# Patient Record
Sex: MALE | Race: WHITE
[De-identification: names, ages, dates, MRNs, and addresses within clinical notes are randomized per-mention and may not be internally consistent; named-entity substitution may affect disease eponyms.]

---

## 2017-01-02 ENCOUNTER — HOSPITAL ENCOUNTER (EMERGENCY)
Dept: HOSPITAL 47 - EC | Age: 52
Discharge: HOME | End: 2017-01-02
Payer: COMMERCIAL

## 2017-01-02 VITALS
TEMPERATURE: 97.8 F | RESPIRATION RATE: 18 BRPM | DIASTOLIC BLOOD PRESSURE: 74 MMHG | HEART RATE: 69 BPM | SYSTOLIC BLOOD PRESSURE: 135 MMHG

## 2017-01-02 DIAGNOSIS — Z88.6: ICD-10-CM

## 2017-01-02 DIAGNOSIS — J44.9: ICD-10-CM

## 2017-01-02 DIAGNOSIS — Z79.51: ICD-10-CM

## 2017-01-02 DIAGNOSIS — F17.200: ICD-10-CM

## 2017-01-02 DIAGNOSIS — J06.9: Primary | ICD-10-CM

## 2017-01-02 PROCEDURE — 87502 INFLUENZA DNA AMP PROBE: CPT

## 2017-01-02 PROCEDURE — 87430 STREP A AG IA: CPT

## 2017-01-02 PROCEDURE — 87081 CULTURE SCREEN ONLY: CPT

## 2017-01-02 PROCEDURE — 99285 EMERGENCY DEPT VISIT HI MDM: CPT

## 2017-01-02 PROCEDURE — 94640 AIRWAY INHALATION TREATMENT: CPT

## 2017-01-02 PROCEDURE — 71020: CPT

## 2017-01-02 NOTE — XR
EXAMINATION TYPE: XR chest 2V

 

DATE OF EXAM: 1/2/2017 8:50 PM

 

COMPARISON: NONE

 

HISTORY: Cough and congestion

 

TECHNIQUE:  Frontal and lateral views of the chest are obtained.

 

FINDINGS:  

There is no focal air space opacity, pleural effusion, or pneumothorax seen.  

 

The cardiac silhouette size is within normal limits.   

 

There is thoracic dextro curvature. The osseous structures are intact.

 

IMPRESSION:  NO ACUTE CARDIOPULMONARY PROCESS.

## 2017-01-02 NOTE — ED
General Adult HPI





- General


Chief complaint: Shortness of Breath


Stated complaint: diff swallow, coughing


Time Seen by Provider: 01/02/17 19:41


Source: patient, RN notes reviewed


Mode of arrival: ambulatory


Limitations: no limitations





- History of Present Illness


Initial comments: 





Patient is a 51-year-old male presents to the emergency room for evaluation of 

cough and congestion.  Patient states he's had a cough for the past week.  

Patient denies taking anything for symptoms.  Patient states he has a 

productive cough and is coughing up white phlegm.  Patient does admit to having 

history of COPD which he uses an inhaler as needed for.  Patient also states 

that he smokes about a pack per day.  Patient denies any fevers.  Patient 

denies chills.  Patient also states he's had a sore throat for the past week.  

Patient states it hurts to swallow.  Patient denies abdominal pain, nausea, 

vomiting, headache, dizziness, ear pain, neck pain.  Patient states he feels 

short of breath after having a coughing attack.  Patient denies any chest pain.

  Patient denies any other significant past medical history besides COPD.





- Related Data


 Home Medications











 Medication  Instructions  Recorded  Confirmed


 


Budesonide/Formoterol Fumarate 2 puff INHALATION RT-BID 01/02/17 01/02/17





[Symbicort 160-4.5 Mcg Inhaler]   








 Previous Rx's











 Medication  Instructions  Recorded


 


Azithromycin [Zithromax Z-pack] 250 mg PO AS DIRECTED #6 tab 01/02/17


 


predniSONE 50 mg PO DAILY #5 tab 01/02/17











 Allergies











Allergy/AdvReac Type Severity Reaction Status Date / Time


 


aspirin Allergy  Rash/Hives Verified 07/28/16 01:49














Review of Systems


ROS Statement: 


Those systems with pertinent positive or pertinent negative responses have been 

documented in the HPI.





ROS Other: All systems not noted in ROS Statement are negative.





Past Medical History


Past Medical History: COPD


History of Any Multi-Drug Resistant Organisms: None Reported


Past Surgical History: No Surgical Hx Reported


Additional Past Surgical History / Comment(s): abdominal surgery


Past Psychological History: No Psychological Hx Reported


Smoking Status: Current every day smoker


Past Alcohol Use History: Occasional


Past Drug Use History: None Reported





General Exam





- General Exam Comments


Initial Comments: 





Sitting in exam room in no acute distress.


Limitations: no limitations


General appearance: alert, in no apparent distress


Head exam: Present: atraumatic, normocephalic, normal inspection


Eye exam: Present: normal appearance


ENT exam: Present: normal exam, normal oropharynx, mucous membranes moist, TM's 

normal bilaterally


Neck exam: Present: normal inspection, full ROM.  Absent: tenderness, 

lymphadenopathy


Respiratory exam: Present: wheezes.  Absent: respiratory distress


Cardiovascular Exam: Present: regular rate, normal rhythm, normal heart sounds


GI/Abdominal exam: Present: soft, normal bowel sounds.  Absent: distended, 

tenderness, guarding, rebound, rigid


Extremities exam: Present: normal inspection, normal capillary refill


Back exam: Present: normal inspection


Neurological exam: Present: alert, oriented X3, CN II-XII intact, normal gait


Psychiatric exam: Present: normal affect, normal mood


Skin exam: Present: warm, dry, intact, normal color.  Absent: rash





Course


 Vital Signs











  01/02/17 01/02/17 01/02/17





  18:25 20:31 20:46


 


Temperature 97.4 F L  


 


Pulse Rate 76 60 66


 


Respiratory 16  





Rate   


 


Blood Pressure 139/74  


 


O2 Sat by Pulse 98  





Oximetry   














  01/02/17





  21:54


 


Temperature 97.8 F


 


Pulse Rate 69


 


Respiratory 18





Rate 


 


Blood Pressure 135/74


 


O2 Sat by Pulse 96





Oximetry 














Medical Decision Making





- Medical Decision Making





Patient is a 51-year-old male presents emergency room for evaluation of cough 

and congestion.  Chest x-ray shows no acute findings.  Patient states he feels 

better after the updraft.  Given patient's past medical history of COPD and 

symptoms lasting longer than 7 days, will place patient on antibiotics and 

prednisone.  Discussed smoking cessation with patient.  Patient states he 

understands everything that was discussed with him.  Return parameters 

discussed.  Case discussed with Dr. Mendoza.





- Lab Data


 Lab Results











  01/02/17 01/02/17 Range/Units





  20:21 20:21 


 


Influenza Type A RNA  Not Detected   (Not Detectd)  


 


Influenza Type B (PCR)  Not Detected   (Not Detectd)  


 


Group A Strep Rapid   Negative  (Negative)  














- Radiology Data


Radiology results: report reviewed, image reviewed





Disposition


Clinical Impression: 


 Upper respiratory infection, COPD (chronic obstructive pulmonary disease)





Disposition: HOME SELF-CARE


Condition: Good


Instructions:  COPD (Chronic Obstructive Pulmonary Disease) (ED), How to Stop 

Smoking (ED)


Additional Instructions: 


Take medications as directed.  Continue using inhaler as needed.  Please follow 

up with primary care provider in 24-48 hours for reevaluation.  If any new 

symptom arises, symptoms worsen or fever develops, return to ER as soon as 

possible.  


Prescriptions: 


Azithromycin [Zithromax Z-pack] 250 mg PO AS DIRECTED #6 tab


predniSONE 50 mg PO DAILY #5 tab


Referrals: 


Caroline Szymanski MD [Primary Care Provider] - 1-2 days


Time of Disposition: 21:43

## 2017-07-24 ENCOUNTER — HOSPITAL ENCOUNTER (EMERGENCY)
Dept: HOSPITAL 47 - EC | Age: 52
Discharge: HOME | End: 2017-07-24
Payer: COMMERCIAL

## 2017-07-24 VITALS
RESPIRATION RATE: 18 BRPM | HEART RATE: 67 BPM | TEMPERATURE: 97.7 F | SYSTOLIC BLOOD PRESSURE: 148 MMHG | DIASTOLIC BLOOD PRESSURE: 76 MMHG

## 2017-07-24 DIAGNOSIS — F17.200: ICD-10-CM

## 2017-07-24 DIAGNOSIS — Z88.6: ICD-10-CM

## 2017-07-24 DIAGNOSIS — Z79.899: ICD-10-CM

## 2017-07-24 DIAGNOSIS — H72.92: Primary | ICD-10-CM

## 2017-07-24 DIAGNOSIS — J44.9: ICD-10-CM

## 2017-07-24 DIAGNOSIS — Z79.51: ICD-10-CM

## 2017-07-24 PROCEDURE — 99282 EMERGENCY DEPT VISIT SF MDM: CPT

## 2017-07-24 NOTE — ED
General Adult HPI





- General


Chief complaint: ENT


Stated complaint: ear pain


Time Seen by Provider: 07/24/17 22:40


Source: patient, RN notes reviewed


Mode of arrival: ambulatory


Limitations: no limitations





- History of Present Illness


Initial comments: 





53 yo male with history of COPD presents with bleeding from his left ear.  

Patient states he was cleaning his ear with a Q-tip, and he was able to get 

some black substance out and then immediately had bright red blood.  He also 

had some pain associated with the use of the Q-tip.  There was no fever chills.

  No pain prior to using a Q-tip.  Denies any hearing loss.  Denies any 

persistent bleeding.





- Related Data


 Home Medications











 Medication  Instructions  Recorded  Confirmed


 


Budesonide/Formoterol Fumarate 2 puff INHALATION RT-BID 01/02/17 07/24/17





[Symbicort 160-4.5 Mcg Inhaler]   


 


Ipratropium-Albuterol Nebulize 3 ml INHALATION RT-Q6H 07/24/17 07/24/17





[Duoneb 0.5 mg-3 mg/3 ml Soln]   


 


Montelukast [Singulair] 10 mg PO HS 07/24/17 07/24/17








 Previous Rx's











 Medication  Instructions  Recorded


 


Ciprofloxacin-Hc Otic Susp [Cipro 3 drops LEFT EAR BID #10 ml 07/24/17





Hc Otic Suspension]  











 Allergies











Allergy/AdvReac Type Severity Reaction Status Date / Time


 


aspirin Allergy  Rash/Hives Verified 07/24/17 22:52














Review of Systems


ROS Statement: 


Those systems with pertinent positive or pertinent negative responses have been 

documented in the HPI.





ROS Other: All systems not noted in ROS Statement are negative.





Past Medical History


Past Medical History: COPD


History of Any Multi-Drug Resistant Organisms: None Reported


Past Surgical History: No Surgical Hx Reported


Additional Past Surgical History / Comment(s): abdominal surgery


Past Psychological History: No Psychological Hx Reported


Smoking Status: Current every day smoker


Past Alcohol Use History: Occasional


Past Drug Use History: None Reported





General Exam


Limitations: no limitations


General appearance: alert, in no apparent distress


Head exam: Present: atraumatic, normocephalic


Eye exam: Present: normal appearance, PERRL


ENT exam: Present: normal exam, normal oropharynx, other (Left external ear 

canal has small amount dry blood, the anterior portion of the left tympanic 

membrane is ruptured.  There is no active hemorrhage.)


Neck exam: Present: normal inspection, full ROM


Respiratory exam: Present: normal lung sounds bilaterally.  Absent: respiratory 

distress


Cardiovascular Exam: Present: regular rate, normal rhythm


GI/Abdominal exam: Present: soft.  Absent: distended





Course





 Vital Signs











  07/24/17





  22:32


 


Temperature 97.7 F


 


Pulse Rate 67


 


Respiratory 18





Rate 


 


Blood Pressure 148/76


 


O2 Sat by Pulse 97





Oximetry 














Medical Decision Making





- Medical Decision Making





52-year-old male presents with bleeding from the left ear.  This was after 

using a Q-tip.  Patient did feel moderate amount of pain which is currently 

resolved.  Denies any hearing loss.  On examination he does have a ruptured 

tympanic membrane on the left.  There is no active bleeding.  Patient is 

prescribed ciprofloxacin suspension and will follow up with both his primary 

care physician and ENT.





Disposition


Clinical Impression: 


 Ruptured tympanic membrane





Disposition: HOME SELF-CARE


Condition: Good


Instructions:  Ruptured Eardrum (ED)


Prescriptions: 


Ciprofloxacin-Hc Otic Susp [Cipro Hc Otic Suspension] 3 drops LEFT EAR BID #10 

ml


Referrals: 


Caroline Szymanski MD [Primary Care Provider] - 1-2 days


Esequiel Torres DO [Doctor of Osteopathic Medicine] - 1-2 days


Time of Disposition: 23:01

## 2018-01-25 ENCOUNTER — HOSPITAL ENCOUNTER (EMERGENCY)
Dept: HOSPITAL 47 - EC | Age: 53
Discharge: HOME | End: 2018-01-25
Payer: COMMERCIAL

## 2018-01-25 VITALS
SYSTOLIC BLOOD PRESSURE: 127 MMHG | RESPIRATION RATE: 20 BRPM | TEMPERATURE: 97 F | HEART RATE: 62 BPM | DIASTOLIC BLOOD PRESSURE: 76 MMHG

## 2018-01-25 DIAGNOSIS — Z88.6: ICD-10-CM

## 2018-01-25 DIAGNOSIS — F17.210: ICD-10-CM

## 2018-01-25 DIAGNOSIS — J20.9: Primary | ICD-10-CM

## 2018-01-25 DIAGNOSIS — Z79.52: ICD-10-CM

## 2018-01-25 DIAGNOSIS — J44.0: ICD-10-CM

## 2018-01-25 LAB
ALBUMIN SERPL-MCNC: 3.9 G/DL (ref 3.5–5)
ALP SERPL-CCNC: 66 U/L (ref 38–126)
ALT SERPL-CCNC: 35 U/L (ref 21–72)
ANION GAP SERPL CALC-SCNC: 7 MMOL/L
AST SERPL-CCNC: 23 U/L (ref 17–59)
BASOPHILS # BLD AUTO: 0.1 K/UL (ref 0–0.2)
BASOPHILS NFR BLD AUTO: 1 %
BUN SERPL-SCNC: 11 MG/DL (ref 9–20)
CALCIUM SPEC-MCNC: 9.5 MG/DL (ref 8.4–10.2)
CHLORIDE SERPL-SCNC: 105 MMOL/L (ref 98–107)
CO2 SERPL-SCNC: 28 MMOL/L (ref 22–30)
EOSINOPHIL # BLD AUTO: 0.1 K/UL (ref 0–0.7)
EOSINOPHIL NFR BLD AUTO: 2 %
ERYTHROCYTE [DISTWIDTH] IN BLOOD BY AUTOMATED COUNT: 4.86 M/UL (ref 4.3–5.9)
ERYTHROCYTE [DISTWIDTH] IN BLOOD: 12.5 % (ref 11.5–15.5)
GLUCOSE SERPL-MCNC: 98 MG/DL (ref 74–99)
HCT VFR BLD AUTO: 45.6 % (ref 39–53)
HGB BLD-MCNC: 15.3 GM/DL (ref 13–17.5)
LYMPHOCYTES # SPEC AUTO: 1.6 K/UL (ref 1–4.8)
LYMPHOCYTES NFR SPEC AUTO: 22 %
MCH RBC QN AUTO: 31.4 PG (ref 25–35)
MCHC RBC AUTO-ENTMCNC: 33.4 G/DL (ref 31–37)
MCV RBC AUTO: 93.8 FL (ref 80–100)
MONOCYTES # BLD AUTO: 0.4 K/UL (ref 0–1)
MONOCYTES NFR BLD AUTO: 6 %
NEUTROPHILS # BLD AUTO: 5.1 K/UL (ref 1.3–7.7)
NEUTROPHILS NFR BLD AUTO: 69 %
PLATELET # BLD AUTO: 275 K/UL (ref 150–450)
POTASSIUM SERPL-SCNC: 4.2 MMOL/L (ref 3.5–5.1)
PROT SERPL-MCNC: 6.4 G/DL (ref 6.3–8.2)
SODIUM SERPL-SCNC: 140 MMOL/L (ref 137–145)
WBC # BLD AUTO: 7.4 K/UL (ref 3.8–10.6)

## 2018-01-25 PROCEDURE — 94640 AIRWAY INHALATION TREATMENT: CPT

## 2018-01-25 PROCEDURE — 71046 X-RAY EXAM CHEST 2 VIEWS: CPT

## 2018-01-25 PROCEDURE — 85025 COMPLETE CBC W/AUTO DIFF WBC: CPT

## 2018-01-25 PROCEDURE — 99284 EMERGENCY DEPT VISIT MOD MDM: CPT

## 2018-01-25 PROCEDURE — 87502 INFLUENZA DNA AMP PROBE: CPT

## 2018-01-25 PROCEDURE — 36415 COLL VENOUS BLD VENIPUNCTURE: CPT

## 2018-01-25 PROCEDURE — 93005 ELECTROCARDIOGRAM TRACING: CPT

## 2018-01-25 PROCEDURE — 80053 COMPREHEN METABOLIC PANEL: CPT

## 2018-01-25 PROCEDURE — 96360 HYDRATION IV INFUSION INIT: CPT

## 2018-01-25 NOTE — ED
General Adult HPI





- General


Chief complaint: Upper Respiratory Infection


Stated complaint: FLU LIKE SYMPTOMS


Time Seen by Provider: 01/25/18 08:59


Source: patient, RN notes reviewed


Mode of arrival: ambulatory


Limitations: no limitations





- History of Present Illness


Initial comments: 





Patient 52-year-old male who presents emergency room today with chief complaint 

of increased bodyaches for cough congestion that started yesterday.  He doesn't 

chills.  He received body aches in his back and his arms.  Patient states fell 

and could not get warm last night.  He does admit to congestion and cough with 

sputum production as been white in color.  States all the symptoms started 

yesterday.  Has not taken any Tylenol Motrin.  Patient denies any other 

complaints or symptoms.  Patient admits to history of COPD a pack-a-day smoker. 

Patient denies any recent shortness of breath, chest pain, nausea or vomiting, 

numbness or tingling, dysuria or hematuria, constipation, headaches or visual 

changes, or any other complaints.





- Related Data


 Home Medications











 Medication  Instructions  Recorded  Confirmed


 


Ipratropium-Albuterol Nebulize 3 ml INHALATION RT-Q6H PRN 07/24/17 01/25/18





[Duoneb 0.5 mg-3 mg/3 ml Soln]   


 


Albuterol Inhaler [Ventolin Hfa 1 - 2 puff INHALATION RT-Q4H PRN 01/25/18 01/25/ 18





Inhaler]   


 


predniSONE See Taper PO AS DIRECTED 01/25/18 01/25/18








 Previous Rx's











 Medication  Instructions  Recorded


 


Azithromycin [Zithromax Z-pack] 0 mg PO AS DIRECTED #6 tab 01/25/18











 Allergies











Allergy/AdvReac Type Severity Reaction Status Date / Time


 


aspirin Allergy  Rash/Hives Verified 01/25/18 09:11














Review of Systems


ROS Statement: 


Those systems with pertinent positive or pertinent negative responses have been 

documented in the HPI.





ROS Other: All systems not noted in ROS Statement are negative.





Past Medical History


Past Medical History: COPD


History of Any Multi-Drug Resistant Organisms: None Reported


Past Surgical History: No Surgical Hx Reported


Additional Past Surgical History / Comment(s): abdominal surgery


Past Psychological History: No Psychological Hx Reported


Smoking Status: Current every day smoker


Past Alcohol Use History: Occasional


Past Drug Use History: None Reported





General Exam





- General Exam Comments


Initial Comments: 





General:  The patient is awake and alert, in no distress, and does not appear 

acutely ill. 


Eye:  Pupils are equal, round and reactive to light, extra-ocular movements are 

intact.  No nystagmus.  There is normal conjunctiva bilaterally.  No signs of 

icterus.  


Ears, nose, mouth and throat:  There are moist mucous membranes and no oral 

lesions. 


Neck:  The neck is supple, there is no tenderness or JVD.  


Cardiovascular:  There is a regular rate and rhythm. No murmur, rub or gallop 

is appreciated.


Respiratory: Expiratory wheeze bilaterally.  respirations are non-labored, 

breath sounds are equal.  No stridor, rales, or rhonchi.


Gastrointestinal:  Soft, non-distended, non-tender abdomen without masses or 

organomegaly noted. There is no rebound or guarding present.  No CVA 

tenderness. Bowel sounds are unremarkable.


Musculoskeletal:  Normal ROM, no tenderness.  Strength 5/5. Sensation intact. 

Pulses equal bilaterally 2+.  


Neurological:  A&O x 3. CN II-XII intact, There are no obvious motor or sensory 

deficits. Coordination appears grossly intact. Speech is normal.


Skin:  Skin is warm and dry and no rashes or lesions are noted. 


Psychiatric:  Cooperative, appropriate mood & affect, normal judgment.  


Limitations: no limitations





Course


 Vital Signs











  01/25/18 01/25/18 01/25/18





  08:53 09:55 10:04


 


Temperature 97.2 F L  


 


Pulse Rate 66 51 L 55 L


 


Respiratory 18  





Rate   


 


Blood Pressure 166/83  


 


O2 Sat by Pulse 99  





Oximetry   














  01/25/18





  10:22


 


Temperature 


 


Pulse Rate 59 L


 


Respiratory 19





Rate 


 


Blood Pressure 127/64


 


O2 Sat by Pulse 99





Oximetry 














EKG Findings





- EKG Comments:


EKG Findings:: EKG performed at 0949:  A 12-lead EKG was performed and 

interpreted by me as showing the following: Rate is 54, and rhythm is normal 

sinus. There are normal QRS complexes and normal R-wave progression. ST 

segments have no elevation or depression, and TX segments appear normal.





Medical Decision Making





- Medical Decision Making





Case discussed in detail with attending physician  Patient reexamined at 

this time shows no signs of distress resting comfortably.  Patient's labs 

reviewed unremarkable.  Chest x-ray shows no pneumonia.  Patient will cover 

with antibiotics for bronchitis infection.  Currently on steroids by the family 

doctor that it began yesterday.  Advised continue steroids use antibiotic use 

of breathing treatments at home.  Advised return here to the emergency room if 

symptoms increase worsen.





- Lab Data


Result diagrams: 


 01/25/18 09:33





 01/25/18 09:33


 Lab Results











  01/25/18 01/25/18 01/25/18 Range/Units





  09:33 09:33 09:36 


 


WBC  7.4    (3.8-10.6)  k/uL


 


RBC  4.86    (4.30-5.90)  m/uL


 


Hgb  15.3    (13.0-17.5)  gm/dL


 


Hct  45.6    (39.0-53.0)  %


 


MCV  93.8    (80.0-100.0)  fL


 


MCH  31.4    (25.0-35.0)  pg


 


MCHC  33.4    (31.0-37.0)  g/dL


 


RDW  12.5    (11.5-15.5)  %


 


Plt Count  275    (150-450)  k/uL


 


Neutrophils %  69    %


 


Lymphocytes %  22    %


 


Monocytes %  6    %


 


Eosinophils %  2    %


 


Basophils %  1    %


 


Neutrophils #  5.1    (1.3-7.7)  k/uL


 


Lymphocytes #  1.6    (1.0-4.8)  k/uL


 


Monocytes #  0.4    (0-1.0)  k/uL


 


Eosinophils #  0.1    (0-0.7)  k/uL


 


Basophils #  0.1    (0-0.2)  k/uL


 


Sodium   140   (137-145)  mmol/L


 


Potassium   4.2   (3.5-5.1)  mmol/L


 


Chloride   105   ()  mmol/L


 


Carbon Dioxide   28   (22-30)  mmol/L


 


Anion Gap   7   mmol/L


 


BUN   11   (9-20)  mg/dL


 


Creatinine   0.79   (0.66-1.25)  mg/dL


 


Est GFR (MDRD) Af Amer   >60   (>60 ml/min/1.73 sqM)  


 


Est GFR (MDRD) Non-Af   >60   (>60 ml/min/1.73 sqM)  


 


Glucose   98   (74-99)  mg/dL


 


Calcium   9.5   (8.4-10.2)  mg/dL


 


Total Bilirubin   0.6   (0.2-1.3)  mg/dL


 


AST   23   (17-59)  U/L


 


ALT   35   (21-72)  U/L


 


Alkaline Phosphatase   66   ()  U/L


 


Total Protein   6.4   (6.3-8.2)  g/dL


 


Albumin   3.9   (3.5-5.0)  g/dL


 


Influenza Type A RNA    Not Detected  (Not Detectd)  


 


Influenza Type B (PCR)    Not Detected  (Not Detectd)  














Disposition


Clinical Impression: 


 Acute bronchitis





Disposition: HOME SELF-CARE


Condition: Good


Instructions:  Acute Bronchitis (ED)


Additional Instructions: 


Please use medication as discussed.  Please follow-up with family doctor in the 

next 2 days of symptoms have not improved.  Please return to emergency room if 

the symptoms increase or worsen or for any other concerns.


Prescriptions: 


Azithromycin [Zithromax Z-pack] 0 mg PO AS DIRECTED #6 tab


Referrals: 


Caroline Szymanski MD [Primary Care Provider] - 1-2 days


Time of Disposition: 11:20

## 2018-01-25 NOTE — XR
EXAMINATION TYPE: XR chest 2V

 

DATE OF EXAM: 1/25/2018

 

COMPARISON: 1/2/2017

 

INDICATION: Cough congestion

 

TECHNIQUE:  Frontal and lateral views of the chest are obtained.

 

FINDINGS:  

The heart size is normal.  

The pulmonary vasculature is normal.

The lungs are clear.  Scoliosis is present.

 

IMPRESSION:  

1. No acute pulmonary process.

## 2018-06-28 ENCOUNTER — HOSPITAL ENCOUNTER (OUTPATIENT)
Dept: HOSPITAL 47 - EC | Age: 53
Setting detail: OBSERVATION
LOS: 1 days | Discharge: HOME | End: 2018-06-29
Attending: INTERNAL MEDICINE | Admitting: INTERNAL MEDICINE
Payer: COMMERCIAL

## 2018-06-28 DIAGNOSIS — F17.210: ICD-10-CM

## 2018-06-28 DIAGNOSIS — R07.89: ICD-10-CM

## 2018-06-28 DIAGNOSIS — R20.0: ICD-10-CM

## 2018-06-28 DIAGNOSIS — M19.90: ICD-10-CM

## 2018-06-28 DIAGNOSIS — E21.3: ICD-10-CM

## 2018-06-28 DIAGNOSIS — J44.1: Primary | ICD-10-CM

## 2018-06-28 DIAGNOSIS — R42: ICD-10-CM

## 2018-06-28 DIAGNOSIS — J44.0: ICD-10-CM

## 2018-06-28 DIAGNOSIS — Z88.6: ICD-10-CM

## 2018-06-28 DIAGNOSIS — Z79.899: ICD-10-CM

## 2018-06-28 DIAGNOSIS — Z79.51: ICD-10-CM

## 2018-06-28 DIAGNOSIS — Z79.52: ICD-10-CM

## 2018-06-28 DIAGNOSIS — J20.9: ICD-10-CM

## 2018-06-28 LAB
ALBUMIN SERPL-MCNC: 4.1 G/DL (ref 3.5–5)
ALP SERPL-CCNC: 85 U/L (ref 38–126)
ALT SERPL-CCNC: 34 U/L (ref 21–72)
AMMONIA PLAS-SCNC: <9 UMOL/L (ref ?–30)
ANION GAP SERPL CALC-SCNC: 13 MMOL/L
APAP SPEC-MCNC: <10 UG/ML
APTT BLD: 22.9 SEC (ref 22–30)
AST SERPL-CCNC: 20 U/L (ref 17–59)
BASOPHILS # BLD AUTO: 0 K/UL (ref 0–0.2)
BASOPHILS NFR BLD AUTO: 0 %
BUN SERPL-SCNC: 11 MG/DL (ref 9–20)
CALCIUM SPEC-MCNC: 9.2 MG/DL (ref 8.4–10.2)
CHLORIDE SERPL-SCNC: 103 MMOL/L (ref 98–107)
CK SERPL-CCNC: 93 U/L (ref 55–170)
CO2 SERPL-SCNC: 24 MMOL/L (ref 22–30)
EOSINOPHIL # BLD AUTO: 0 K/UL (ref 0–0.7)
EOSINOPHIL NFR BLD AUTO: 0 %
ERYTHROCYTE [DISTWIDTH] IN BLOOD BY AUTOMATED COUNT: 4.89 M/UL (ref 4.3–5.9)
ERYTHROCYTE [DISTWIDTH] IN BLOOD: 13 % (ref 11.5–15.5)
GLUCOSE BLD-MCNC: 139 MG/DL (ref 75–99)
GLUCOSE SERPL-MCNC: 109 MG/DL (ref 74–99)
HCT VFR BLD AUTO: 46.5 % (ref 39–53)
HGB BLD-MCNC: 15.8 GM/DL (ref 13–17.5)
INR PPP: 1 (ref ?–1.2)
LACTATE BLDV-SCNC: 1.4 MMOL/L (ref 0.7–2)
LIPASE SERPL-CCNC: 31 U/L (ref 23–300)
LYMPHOCYTES # SPEC AUTO: 1.7 K/UL (ref 1–4.8)
LYMPHOCYTES NFR SPEC AUTO: 13 %
MAGNESIUM SPEC-SCNC: 1.8 MG/DL (ref 1.6–2.3)
MCH RBC QN AUTO: 32.4 PG (ref 25–35)
MCHC RBC AUTO-ENTMCNC: 34.1 G/DL (ref 31–37)
MCV RBC AUTO: 95.1 FL (ref 80–100)
MONOCYTES # BLD AUTO: 0.6 K/UL (ref 0–1)
MONOCYTES NFR BLD AUTO: 5 %
NEUTROPHILS # BLD AUTO: 10.7 K/UL (ref 1.3–7.7)
NEUTROPHILS NFR BLD AUTO: 81 %
PH UR: 6.5 [PH] (ref 5–8)
PLATELET # BLD AUTO: 295 K/UL (ref 150–450)
POTASSIUM SERPL-SCNC: 3.6 MMOL/L (ref 3.5–5.1)
PROT SERPL-MCNC: 6.5 G/DL (ref 6.3–8.2)
PT BLD: 9.9 SEC (ref 9–12)
SALICYLATES SERPL-MCNC: <1 MG/DL
SODIUM SERPL-SCNC: 140 MMOL/L (ref 137–145)
SP GR UR: 1 (ref 1–1.03)
TROPONIN I SERPL-MCNC: <0.012 NG/ML (ref 0–0.03)
UROBILINOGEN UR QL STRIP: <2 MG/DL (ref ?–2)
WBC # BLD AUTO: 13.1 K/UL (ref 3.8–10.6)

## 2018-06-28 PROCEDURE — 83735 ASSAY OF MAGNESIUM: CPT

## 2018-06-28 PROCEDURE — 96376 TX/PRO/DX INJ SAME DRUG ADON: CPT

## 2018-06-28 PROCEDURE — 80053 COMPREHEN METABOLIC PANEL: CPT

## 2018-06-28 PROCEDURE — 82553 CREATINE MB FRACTION: CPT

## 2018-06-28 PROCEDURE — 83880 ASSAY OF NATRIURETIC PEPTIDE: CPT

## 2018-06-28 PROCEDURE — 93005 ELECTROCARDIOGRAM TRACING: CPT

## 2018-06-28 PROCEDURE — 85610 PROTHROMBIN TIME: CPT

## 2018-06-28 PROCEDURE — 83690 ASSAY OF LIPASE: CPT

## 2018-06-28 PROCEDURE — 87040 BLOOD CULTURE FOR BACTERIA: CPT

## 2018-06-28 PROCEDURE — 85025 COMPLETE CBC W/AUTO DIFF WBC: CPT

## 2018-06-28 PROCEDURE — 94760 N-INVAS EAR/PLS OXIMETRY 1: CPT

## 2018-06-28 PROCEDURE — 85730 THROMBOPLASTIN TIME PARTIAL: CPT

## 2018-06-28 PROCEDURE — 83605 ASSAY OF LACTIC ACID: CPT

## 2018-06-28 PROCEDURE — 84443 ASSAY THYROID STIM HORMONE: CPT

## 2018-06-28 PROCEDURE — 71046 X-RAY EXAM CHEST 2 VIEWS: CPT

## 2018-06-28 PROCEDURE — 80306 DRUG TEST PRSMV INSTRMNT: CPT

## 2018-06-28 PROCEDURE — 94640 AIRWAY INHALATION TREATMENT: CPT

## 2018-06-28 PROCEDURE — 84484 ASSAY OF TROPONIN QUANT: CPT

## 2018-06-28 PROCEDURE — 82550 ASSAY OF CK (CPK): CPT

## 2018-06-28 PROCEDURE — 82140 ASSAY OF AMMONIA: CPT

## 2018-06-28 PROCEDURE — 81003 URINALYSIS AUTO W/O SCOPE: CPT

## 2018-06-28 PROCEDURE — 93306 TTE W/DOPPLER COMPLETE: CPT

## 2018-06-28 PROCEDURE — 99285 EMERGENCY DEPT VISIT HI MDM: CPT

## 2018-06-28 PROCEDURE — 96374 THER/PROPH/DIAG INJ IV PUSH: CPT

## 2018-06-28 PROCEDURE — 83520 IMMUNOASSAY QUANT NOS NONAB: CPT

## 2018-06-28 PROCEDURE — 80320 DRUG SCREEN QUANTALCOHOLS: CPT

## 2018-06-28 PROCEDURE — 36415 COLL VENOUS BLD VENIPUNCTURE: CPT

## 2018-06-28 PROCEDURE — 96361 HYDRATE IV INFUSION ADD-ON: CPT

## 2018-06-28 RX ADMIN — METHYLPREDNISOLONE SODIUM SUCCINATE SCH MG: 125 INJECTION, POWDER, FOR SOLUTION INTRAMUSCULAR; INTRAVENOUS at 23:32

## 2018-06-28 RX ADMIN — IPRATROPIUM BROMIDE AND ALBUTEROL SULFATE SCH ML: .5; 3 SOLUTION RESPIRATORY (INHALATION) at 21:40

## 2018-06-28 RX ADMIN — HEPARIN SODIUM SCH: 5000 INJECTION, SOLUTION INTRAVENOUS; SUBCUTANEOUS at 21:39

## 2018-06-28 RX ADMIN — IPRATROPIUM BROMIDE AND ALBUTEROL SULFATE SCH: .5; 3 SOLUTION RESPIRATORY (INHALATION) at 18:39

## 2018-06-28 RX ADMIN — DOXYCYCLINE SCH MG: 100 CAPSULE ORAL at 21:37

## 2018-06-28 RX ADMIN — INSULIN ASPART SCH: 100 INJECTION, SOLUTION INTRAVENOUS; SUBCUTANEOUS at 21:37

## 2018-06-28 RX ADMIN — INSULIN ASPART SCH: 100 INJECTION, SOLUTION INTRAVENOUS; SUBCUTANEOUS at 18:06

## 2018-06-28 RX ADMIN — METHYLPREDNISOLONE SODIUM SUCCINATE SCH MG: 125 INJECTION, POWDER, FOR SOLUTION INTRAMUSCULAR; INTRAVENOUS at 18:05

## 2018-06-28 RX ADMIN — BUDESONIDE SCH MG: 0.5 INHALANT ORAL at 21:39

## 2018-06-28 NOTE — XR
EXAMINATION TYPE: XR chest 2V

 

DATE OF EXAM: 6/28/2018

 

COMPARISON: 1/25/2018

 

HISTORY: 53-year-old male with chest pain

 

TECHNIQUE:  PA and lateral views

 

FINDINGS:  

Heart normal size. Aorta within normal limits. Strandy densities in the lungs with mild interstitial 
prominence and hyperinflation compatible with underlying COPD. No consolidation or pleural effusion. 
Dextroconvex scoliosis.

 

 

IMPRESSION:  

COPD and chronic parenchymal changes. No acute process seen. Scoliosis.

## 2018-06-28 NOTE — P.CNPUL
History of Present Illness


Consult date: 06/28/18


Requesting physician: Jarvis Marques


Reason for consult: COPD


Chief complaint: Shortness of breath


History of present illness: 





Jose Choi is a 53 year old male being seen and examined evaluated today while 

in the emergency room waiting for a bed inpatient.  The patient states he has 

been having some chest pressure along with significant shortness of breath that 

has been ongoing since last Saturday.  He states he was seen in the emergency 

room twice and discharged home with antibiotics and a steroid taper.  He states 

he has been having intermittent chest pain with numbness in his left arm as 

well.  He has been taking breathing treatments at home which have not been 

helping.  He does have a cough however has been unable to bring up any sputum 

due to thickness.  He denies any fevers states he has had some chills with 

lightheadedness.  Chest x-ray was reviewed and shows no acute process and 

chronic COPD.  Patient is also followed in the outpatient setting with our 

office as well.  He is a current 1 pack per day smoker for approximately 40 

years.  He did used to smoke 2 and half packs per day.  Upon examination he is 

lying in bed with family at bedside on room air.  Has shortness of breath with 

exertion and activity as well as a congested cough.  He is afebrile denies any 

further complaints.  All labs were reviewed, troponins were negative





Past Medical History


Past Medical History: COPD


History of Any Multi-Drug Resistant Organisms: None Reported


Past Surgical History: No Surgical Hx Reported


Additional Past Surgical History / Comment(s): abdominal surgery


Past Psychological History: No Psychological Hx Reported


Smoking Status: Current every day smoker


Past Alcohol Use History: Occasional


Past Drug Use History: None Reported





Medications and Allergies


 Home Medications











 Medication  Instructions  Recorded  Confirmed  Type


 


Azithromycin [Zithromax Z-pack] See Taper PO DAILY 06/28/18 06/28/18 History


 


Beclomethasone Dipropionate [Qvar 1 puff INHALATION RT-BID 06/28/18 06/28/18 

History





Redihaler]    


 


Lidocaine 4% Cream [Lmx 4] 1 applic TOPICAL BID PRN 06/28/18 06/28/18 History


 


predniSONE 60 mg PO DAILY 06/28/18 06/28/18 History











 Allergies











Allergy/AdvReac Type Severity Reaction Status Date / Time


 


aspirin Allergy  Rash/Hives Verified 06/28/18 11:04














Physical Exam


Vitals: 


 Vital Signs











  Temp Pulse Resp BP Pulse Ox


 


 06/28/18 11:01  97.6 F  95  24  177/101  96








 Intake and Output











 06/27/18 06/28/18 06/28/18





 22:59 06:59 14:59


 


Other:   


 


  Weight   65.771 kg














GENERAL EXAM: Alert, comfortable in no apparent distress.


HEAD: Normocephalic.


EYES: Normal reaction of pupils, equal size.


NOSE: Clear with pink turbinates.


THROAT: No erythema or exudates.


NECK: No masses, no JVD.


CHEST: No chest wall deformity.


LUNGS: Lungs noted to be coarse throughout with some expiratory wheezing.  

Bases diminished.


CVS: S1 and S2 normal with no audible mumurs, regular rhythm.


ABDOMEN: No hepatosplenomegaly, normal bowel sounds, no guarding or rigidity.


EXTREMITIES: No edema noted, pedal pulses palpable.


CENTRAL NERVOUS SYSTEM: No focal deficits, tone is normal in all 4 extremities.





Results





- Laboratory Findings


CBC and BMP: 


 06/28/18 11:16





 06/28/18 11:16


PT/INR, D-dimer











PT  9.9 sec (9.0-12.0)   06/28/18  11:16    


 


INR  1.0  (<1.2)   06/28/18  11:16    








Abnormal lab findings: 


 Abnormal Labs











  06/28/18 06/28/18





  11:16 11:16


 


WBC   13.1 H


 


Neutrophils #   10.7 H


 


Glucose  109 H 














- Diagnostic Findings


Chest x-ray: report reviewed, image reviewed





Assessment and Plan


Assessment: 





Assessment





Acute exacerbation of COPD


Tracheobronchitis


Nicotine dependence


Chest pain








Plan





Medications have been reviewed and will be continued as ordered. 


Add doxycycline


IV steroids


Continue with pulmonary hygiene, coughing and deep breathing exercises, and 

supportive care. 


Supplemental oxygen to maintain oxygen saturations of 92% or better. 


Continue nebulizer treatments.  Add budesonide


Obtain sputum culture


Repeat labs in the morning


Smoking cessation


GI and DVT prophylaxis. 


We will continue to monitor labs/results and adjust treatment as necessary. 


Further recommendations pending.





I performed an examination of the patient and discussed their management with 

the nurse practitioner. I have reviewed the nurse practitioner's note and agree 

with the documented findings and plan of care.

## 2018-06-28 NOTE — ED
General Adult HPI





- General


Chief complaint: Chest Pain


Stated complaint: Chest pain


Time Seen by Provider: 06/28/18 11:10


Source: patient, family, RN notes reviewed, old records reviewed


Mode of arrival: ambulatory


Limitations: no limitations





- History of Present Illness


Initial comments: 





This is a 53-year-old male to the ER for evaluation.  This male presents ER for 

evaluation regarding not acting appropriately, sleeping, fatigue, occasional 

chest pain shortness of breath right-sided dizziness.  Patient himself is a 

poor historian, not appropriately, not participating in history and physical





- Related Data


 Home Medications











 Medication  Instructions  Recorded  Confirmed


 


Azithromycin [Zithromax Z-pack] See Taper PO DAILY 06/28/18 06/28/18


 


Beclomethasone Dipropionate [Qvar 1 puff INHALATION RT-BID 06/28/18 06/28/18





Redihaler]   


 


Lidocaine 4% Cream [Lmx 4] 1 applic TOPICAL BID PRN 06/28/18 06/28/18


 


predniSONE 60 mg PO DAILY 06/28/18 06/28/18











 Allergies











Allergy/AdvReac Type Severity Reaction Status Date / Time


 


aspirin Allergy  Rash/Hives Verified 06/28/18 11:04














Review of Systems


ROS Statement: 


Those systems with pertinent positive or pertinent negative responses have been 

documented in the HPI.





ROS Other: All systems not noted in ROS Statement are negative.





Past Medical History


Past Medical History: COPD


History of Any Multi-Drug Resistant Organisms: None Reported


Past Surgical History: No Surgical Hx Reported


Additional Past Surgical History / Comment(s): abdominal surgery


Past Psychological History: No Psychological Hx Reported


Smoking Status: Current every day smoker


Past Alcohol Use History: Occasional


Past Drug Use History: None Reported





General Exam


Limitations: no limitations


General appearance: alert, in no apparent distress


Head exam: Present: atraumatic, normocephalic, normal inspection


Eye exam: Present: normal appearance, PERRL, EOMI.  Absent: scleral icterus, 

conjunctival injection, periorbital swelling


ENT exam: Present: normal exam, mucous membranes moist


Neck exam: Present: normal inspection.  Absent: tenderness, meningismus, 

lymphadenopathy


Respiratory exam: Present: normal lung sounds bilaterally.  Absent: respiratory 

distress, wheezes, rales, rhonchi, stridor


Cardiovascular Exam: Present: regular rate, normal rhythm, normal heart sounds.

  Absent: systolic murmur, diastolic murmur, rubs, gallop, clicks


GI/Abdominal exam: Present: soft, normal bowel sounds.  Absent: distended, 

tenderness, guarding, rebound, rigid


Extremities exam: Present: normal inspection, full ROM, normal capillary 

refill.  Absent: tenderness, pedal edema, joint swelling, calf tenderness


Back exam: Present: normal inspection


Neurological exam: Present: alert, oriented X3, CN II-XII intact


Psychiatric exam: Present: normal affect, normal mood


Skin exam: Present: warm, dry, intact, normal color.  Absent: rash





Course


 Vital Signs











  06/28/18





  11:01


 


Temperature 97.6 F


 


Pulse Rate 95


 


Respiratory 24





Rate 


 


Blood Pressure 177/101


 


O2 Sat by Pulse 96





Oximetry 














- Reevaluation(s)


Reevaluation #1: 





06/28/18 14:01


Patient appears to be mildly awake and arousable currently.  Breathing appears 

improved





EKG Findings





- EKG Comments:


EKG Findings:: EKG shows sinus rhythm rate of 99, , QRS 92, QTc 472





Medical Decision Making





- Medical Decision Making





53 male the ER for evaluation.  Patient presents today for evaluation COPD.  

Patient will be admitted for pulmonology evaluation, evaluation regarding 

mental state





- Lab Data


Result diagrams: 


 06/28/18 11:16





 06/28/18 11:16


 Lab Results











  06/28/18 06/28/18 06/28/18 Range/Units





  11:16 11:16 11:16 


 


WBC     (3.8-10.6)  k/uL


 


RBC     (4.30-5.90)  m/uL


 


Hgb     (13.0-17.5)  gm/dL


 


Hct     (39.0-53.0)  %


 


MCV     (80.0-100.0)  fL


 


MCH     (25.0-35.0)  pg


 


MCHC     (31.0-37.0)  g/dL


 


RDW     (11.5-15.5)  %


 


Plt Count     (150-450)  k/uL


 


Neutrophils %     %


 


Lymphocytes %     %


 


Monocytes %     %


 


Eosinophils %     %


 


Basophils %     %


 


Neutrophils #     (1.3-7.7)  k/uL


 


Lymphocytes #     (1.0-4.8)  k/uL


 


Monocytes #     (0-1.0)  k/uL


 


Eosinophils #     (0-0.7)  k/uL


 


Basophils #     (0-0.2)  k/uL


 


PT     (9.0-12.0)  sec


 


INR     (<1.2)  


 


APTT     (22.0-30.0)  sec


 


Sodium   140   (137-145)  mmol/L


 


Potassium   3.6   (3.5-5.1)  mmol/L


 


Chloride   103   ()  mmol/L


 


Carbon Dioxide   24   (22-30)  mmol/L


 


Anion Gap   13   mmol/L


 


BUN   11   (9-20)  mg/dL


 


Creatinine   0.86   (0.66-1.25)  mg/dL


 


Est GFR (CKD-EPI)AfAm   >90   (>60 ml/min/1.73 sqM)  


 


Est GFR (CKD-EPI)NonAf   >90   (>60 ml/min/1.73 sqM)  


 


Glucose   109 H   (74-99)  mg/dL


 


Plasma Lactic Acid Fritz  1.4    (0.7-2.0)  mmol/L


 


Calcium   9.2   (8.4-10.2)  mg/dL


 


Magnesium   1.8   (1.6-2.3)  mg/dL


 


Total Bilirubin   0.6   (0.2-1.3)  mg/dL


 


AST   20   (17-59)  U/L


 


ALT   34   (21-72)  U/L


 


Alkaline Phosphatase   85   ()  U/L


 


Ammonia  <9    (<30)  umol/L


 


Total Creatine Kinase    93  ()  U/L


 


CK-MB (CK-2)    2.1  (0.0-2.4)  ng/mL


 


CK-MB (CK-2) Rel Index    2.3  


 


Troponin I    <0.012  (0.000-0.034)  ng/mL


 


NT-Pro-B Natriuret Pep     pg/mL


 


Total Protein   6.5   (6.3-8.2)  g/dL


 


Albumin   4.1   (3.5-5.0)  g/dL


 


Lipase   31   ()  U/L


 


TSH   0.780   (0.465-4.680)  mIU/L


 


Urine Color     


 


Urine Appearance     (Clear)  


 


Urine pH     (5.0-8.0)  


 


Ur Specific Gravity     (1.001-1.035)  


 


Urine Protein     (Negative)  


 


Urine Glucose (UA)     (Negative)  


 


Urine Ketones     (Negative)  


 


Urine Blood     (Negative)  


 


Urine Nitrite     (Negative)  


 


Urine Bilirubin     (Negative)  


 


Urine Urobilinogen     (<2.0)  mg/dL


 


Ur Leukocyte Esterase     (Negative)  


 


Salicylates   <1.0   mg/dL


 


Urine Opiates Screen     (NotDetected)  


 


Ur Oxycodone Screen     (NotDetected)  


 


Urine Methadone Screen     (NotDetected)  


 


Ur Propoxyphene Screen     (NotDetected)  


 


Acetaminophen   <10.0   ug/mL


 


Ur Barbiturates Screen     (NotDetected)  


 


U Tricyclic Antidepress     (NotDetected)  


 


Ur Phencyclidine Scrn     (NotDetected)  


 


Ur Amphetamines Screen     (NotDetected)  


 


U Methamphetamines Scrn     (NotDetected)  


 


U Benzodiazepines Scrn     (NotDetected)  


 


Urine Cocaine Screen     (NotDetected)  


 


U Marijuana (THC) Screen     (NotDetected)  


 


Serum Alcohol   <10   mg/dL














  06/28/18 06/28/18 06/28/18 Range/Units





  11:16 11:16 11:16 


 


WBC  13.1 H    (3.8-10.6)  k/uL


 


RBC  4.89    (4.30-5.90)  m/uL


 


Hgb  15.8    (13.0-17.5)  gm/dL


 


Hct  46.5    (39.0-53.0)  %


 


MCV  95.1    (80.0-100.0)  fL


 


MCH  32.4    (25.0-35.0)  pg


 


MCHC  34.1    (31.0-37.0)  g/dL


 


RDW  13.0    (11.5-15.5)  %


 


Plt Count  295    (150-450)  k/uL


 


Neutrophils %  81    %


 


Lymphocytes %  13    %


 


Monocytes %  5    %


 


Eosinophils %  0    %


 


Basophils %  0    %


 


Neutrophils #  10.7 H    (1.3-7.7)  k/uL


 


Lymphocytes #  1.7    (1.0-4.8)  k/uL


 


Monocytes #  0.6    (0-1.0)  k/uL


 


Eosinophils #  0.0    (0-0.7)  k/uL


 


Basophils #  0.0    (0-0.2)  k/uL


 


PT    9.9  (9.0-12.0)  sec


 


INR    1.0  (<1.2)  


 


APTT    22.9  (22.0-30.0)  sec


 


Sodium     (137-145)  mmol/L


 


Potassium     (3.5-5.1)  mmol/L


 


Chloride     ()  mmol/L


 


Carbon Dioxide     (22-30)  mmol/L


 


Anion Gap     mmol/L


 


BUN     (9-20)  mg/dL


 


Creatinine     (0.66-1.25)  mg/dL


 


Est GFR (CKD-EPI)AfAm     (>60 ml/min/1.73 sqM)  


 


Est GFR (CKD-EPI)NonAf     (>60 ml/min/1.73 sqM)  


 


Glucose     (74-99)  mg/dL


 


Plasma Lactic Acid Fritz     (0.7-2.0)  mmol/L


 


Calcium     (8.4-10.2)  mg/dL


 


Magnesium     (1.6-2.3)  mg/dL


 


Total Bilirubin     (0.2-1.3)  mg/dL


 


AST     (17-59)  U/L


 


ALT     (21-72)  U/L


 


Alkaline Phosphatase     ()  U/L


 


Ammonia     (<30)  umol/L


 


Total Creatine Kinase     ()  U/L


 


CK-MB (CK-2)     (0.0-2.4)  ng/mL


 


CK-MB (CK-2) Rel Index     


 


Troponin I     (0.000-0.034)  ng/mL


 


NT-Pro-B Natriuret Pep   154   pg/mL


 


Total Protein     (6.3-8.2)  g/dL


 


Albumin     (3.5-5.0)  g/dL


 


Lipase     ()  U/L


 


TSH     (0.465-4.680)  mIU/L


 


Urine Color     


 


Urine Appearance     (Clear)  


 


Urine pH     (5.0-8.0)  


 


Ur Specific Gravity     (1.001-1.035)  


 


Urine Protein     (Negative)  


 


Urine Glucose (UA)     (Negative)  


 


Urine Ketones     (Negative)  


 


Urine Blood     (Negative)  


 


Urine Nitrite     (Negative)  


 


Urine Bilirubin     (Negative)  


 


Urine Urobilinogen     (<2.0)  mg/dL


 


Ur Leukocyte Esterase     (Negative)  


 


Salicylates     mg/dL


 


Urine Opiates Screen     (NotDetected)  


 


Ur Oxycodone Screen     (NotDetected)  


 


Urine Methadone Screen     (NotDetected)  


 


Ur Propoxyphene Screen     (NotDetected)  


 


Acetaminophen     ug/mL


 


Ur Barbiturates Screen     (NotDetected)  


 


U Tricyclic Antidepress     (NotDetected)  


 


Ur Phencyclidine Scrn     (NotDetected)  


 


Ur Amphetamines Screen     (NotDetected)  


 


U Methamphetamines Scrn     (NotDetected)  


 


U Benzodiazepines Scrn     (NotDetected)  


 


Urine Cocaine Screen     (NotDetected)  


 


U Marijuana (THC) Screen     (NotDetected)  


 


Serum Alcohol     mg/dL














  06/28/18 Range/Units





  12:55 


 


WBC   (3.8-10.6)  k/uL


 


RBC   (4.30-5.90)  m/uL


 


Hgb   (13.0-17.5)  gm/dL


 


Hct   (39.0-53.0)  %


 


MCV   (80.0-100.0)  fL


 


MCH   (25.0-35.0)  pg


 


MCHC   (31.0-37.0)  g/dL


 


RDW   (11.5-15.5)  %


 


Plt Count   (150-450)  k/uL


 


Neutrophils %   %


 


Lymphocytes %   %


 


Monocytes %   %


 


Eosinophils %   %


 


Basophils %   %


 


Neutrophils #   (1.3-7.7)  k/uL


 


Lymphocytes #   (1.0-4.8)  k/uL


 


Monocytes #   (0-1.0)  k/uL


 


Eosinophils #   (0-0.7)  k/uL


 


Basophils #   (0-0.2)  k/uL


 


PT   (9.0-12.0)  sec


 


INR   (<1.2)  


 


APTT   (22.0-30.0)  sec


 


Sodium   (137-145)  mmol/L


 


Potassium   (3.5-5.1)  mmol/L


 


Chloride   ()  mmol/L


 


Carbon Dioxide   (22-30)  mmol/L


 


Anion Gap   mmol/L


 


BUN   (9-20)  mg/dL


 


Creatinine   (0.66-1.25)  mg/dL


 


Est GFR (CKD-EPI)AfAm   (>60 ml/min/1.73 sqM)  


 


Est GFR (CKD-EPI)NonAf   (>60 ml/min/1.73 sqM)  


 


Glucose   (74-99)  mg/dL


 


Plasma Lactic Acid Fritz   (0.7-2.0)  mmol/L


 


Calcium   (8.4-10.2)  mg/dL


 


Magnesium   (1.6-2.3)  mg/dL


 


Total Bilirubin   (0.2-1.3)  mg/dL


 


AST   (17-59)  U/L


 


ALT   (21-72)  U/L


 


Alkaline Phosphatase   ()  U/L


 


Ammonia   (<30)  umol/L


 


Total Creatine Kinase   ()  U/L


 


CK-MB (CK-2)   (0.0-2.4)  ng/mL


 


CK-MB (CK-2) Rel Index   


 


Troponin I   (0.000-0.034)  ng/mL


 


NT-Pro-B Natriuret Pep   pg/mL


 


Total Protein   (6.3-8.2)  g/dL


 


Albumin   (3.5-5.0)  g/dL


 


Lipase   ()  U/L


 


TSH   (0.465-4.680)  mIU/L


 


Urine Color  Light Yellow  


 


Urine Appearance  Clear  (Clear)  


 


Urine pH  6.5  (5.0-8.0)  


 


Ur Specific Gravity  1.005  (1.001-1.035)  


 


Urine Protein  Negative  (Negative)  


 


Urine Glucose (UA)  Negative  (Negative)  


 


Urine Ketones  Negative  (Negative)  


 


Urine Blood  Negative  (Negative)  


 


Urine Nitrite  Negative  (Negative)  


 


Urine Bilirubin  Negative  (Negative)  


 


Urine Urobilinogen  <2.0  (<2.0)  mg/dL


 


Ur Leukocyte Esterase  Negative  (Negative)  


 


Salicylates   mg/dL


 


Urine Opiates Screen  Not Detected  (NotDetected)  


 


Ur Oxycodone Screen  Not Detected  (NotDetected)  


 


Urine Methadone Screen  Not Detected  (NotDetected)  


 


Ur Propoxyphene Screen  Not Detected  (NotDetected)  


 


Acetaminophen   ug/mL


 


Ur Barbiturates Screen  Not Detected  (NotDetected)  


 


U Tricyclic Antidepress  Not Detected  (NotDetected)  


 


Ur Phencyclidine Scrn  Not Detected  (NotDetected)  


 


Ur Amphetamines Screen  Not Detected  (NotDetected)  


 


U Methamphetamines Scrn  Not Detected  (NotDetected)  


 


U Benzodiazepines Scrn  Not Detected  (NotDetected)  


 


Urine Cocaine Screen  Not Detected  (NotDetected)  


 


U Marijuana (THC) Screen  Not Detected  (NotDetected)  


 


Serum Alcohol   mg/dL














- Radiology Data


Radiology results: report reviewed (Chest x-rays negative for acute disease), 

image reviewed





Disposition


Clinical Impression: 


 Acute bronchitis with COPD





Disposition: ADMITTED AS IP TO THIS HOSP


Condition: Fair


Is patient prescribed a controlled substance at d/c from ED?: No


Referrals: 


Caroline Szymanski MD [Primary Care Provider] - 1-2 days

## 2018-06-28 NOTE — P.HPIM
History of Present Illness


H&P Date: 18


Chief Complaint: Left-sided chest pain with increased shortness breath.





This is a 53-year-old  male one of Dr. Szymanski with a previous 

medical history significant for chronic tobacco use and dependence with the 

chronic COPD, patient developed to have left-sided chest pain last Friday and 

he ended up coming to the emergency department at [facility] 

yesterday and the day before yesterday and he was complaining of some headache 

at the same time he ended up having computed tomography scan of the brain that 

showed no evidence of acute of normalities, chest x-ray showed the pleural 

reaction without any evidence of acute pulmonary disease with the normal heart, 

patient was given oral antibiotic and the however the patient never took the 

medication he did receive a shot of morphine in the ER and he was sent home on 

patient today was driving to go down to [place] for evaluation according to the 

patient and that he called Dr. TIERRA Moss's office and he asked him to go to the 

ER at the [facility] where he was seen and the patient labs were okay 

patient was seen in the emergency department and he was seen earlier bipolar 

medicine he was recommended for the patient to be admitted to the hospital for 

evaluation by pulmonary as well as by cardiology due to his chest pain patient 

EKG did not show any evidence of acute abnormalities and no ST-T wave changes.





Review of Systems


Constitutional: Denies anorexia, Denies chronic headaches, Denies lethargy, 

Denies malaise, Denies weakness, Denies weight gain


Eyes: denies blurred vision, denies bulging eye, denies decreased vision


Ears: deny: decreased hearing


Ears, nose, mouth and throat: Denies dysphagia, Denies neck lump, Denies sore 

throat


Cardiovascular: Reports chest pain, Reports decreased exercise tolerance, 

Reports dyspnea on exertion, Reports shortness of breath, Denies high blood 

pressure, Denies rapid heart beat, Denies syncope


Respiratory: Reports congestion, Reports cough with sputum, Reports dyspnea, 

Reports wheezing, Denies home oxygen, Denies sleep apnea, Denies snoring


Gastrointestinal: Denies abdominal pain, Denies BRBPR, Denies heartburn, Denies 

melena, Denies nausea, Denies vomiting


Genitourinary: Denies dysuria, Denies polyuria


Musculoskeletal: Denies myalgias


Musculoskeletal: absent: ankle pain, ankle stiffness, ankle swelling, elbow pain

, elbow stiffness, elbow swelling, foot pain, foot stiffness, foot swelling, 

hand pain, hand stiffness, hand swelling, hip pain, hip stiffness, hip swelling

, knee pain, knee stiffness, knee swelling, shoulder pain, shoulder stiffness, 

shoulder swelling, wrist pain, wrist stiffness, wrist swelling


Integumentary: Denies pruritus, Denies rash


Neurological: Denies numbness, Denies weakness


Psychiatric: Denies anxiety, Denies depression


Endocrine: Denies fatigue, Denies weight change





Past Medical History


Past Medical History: COPD, Osteoarthritis (OA)


Additional Past Medical History / Comment(s): Hyperparathyroidism status post 

parathyroidectomy.


History of Any Multi-Drug Resistant Organisms: None Reported


Additional Past Surgical History / Comment(s): abdominal surgery, 

parathyroidectomy and sinus surgery.


Past Psychological History: No Psychological Hx Reported


Smoking Status: Current every day smoker (Patient smokes about pack every day 

he used to smoke about 2-1/2 pack a day when he started but now is down to pack 

every day)


Past Alcohol Use History: Occasional (He drinks occasionally but he used to be 

a heavy drinker used to drink a gallon of alcohol between beer and whiskey 

every 2 days but that was many years ago.)


Past Drug Use History: None Reported





- Past Family History


  ** Father


Family Medical History: No Reported History (Father  in his 30s from 

homicide)





  ** Mother


Family Medical History: No Reported History (Mother is 81-year-old has no major 

medical problems.)





  ** Brother(s)


Family Medical History: Unable to Obtain (Patient has 8 brothers doesn't know 

much about their medical history.)





  ** Sister(s)


Family Medical History: Unable to Obtain (Patient has one sister does not know 

much about her medical history.)





  ** Son(s)


Family Medical History: No Reported History (Patient has 3 sons no major 

medical problems.)





Medications and Allergies


 Home Medications











 Medication  Instructions  Recorded  Confirmed  Type


 


Azithromycin [Zithromax Z-pack] See Taper PO DAILY 18 History


 


Beclomethasone Dipropionate [Qvar 1 puff INHALATION RT-BID 18 

History





Redihaler]    


 


Lidocaine 4% Cream [Lmx 4] 1 applic TOPICAL BID PRN 18 History


 


predniSONE 60 mg PO DAILY 18 History











 Allergies











Allergy/AdvReac Type Severity Reaction Status Date / Time


 


aspirin Allergy  Rash/Hives Verified 18 11:04














Physical Exam


Vitals: 


 Vital Signs











  Temp Pulse Resp BP Pulse Ox


 


 18 14:15   90   138/77  98


 


 18 13:15   84   142/70  99


 


 18 12:15   77   141/75  99


 


 18 11:01  97.6 F  95  24  177/101  96








 Intake and Output











 18





 06:59 14:59 22:59


 


Other:   


 


  Weight  65.771 kg 














- Constitutional


General appearance: average body habitus, mild distress





- EENT


Eyes: anicteric sclerae, EOMI, PERRLA, no ptosis, no scleral icterus, normal 

appearance


ENT: hearing grossly normal, NA/AT, normal oropharynx, no thrush


Ears: bilateral: normal





- Neck


Neck: no lymphadenopathy, normal ROM, no rigidity, no stridor, no thyromegaly


Carotids: bilateral: upstroke normal


Thyroid: bilateral: normal size





- Respiratory


Respiratory: bilateral: diminished, wheezing, prolonged expiration, negative: 

dullness, rales, rhonchi





- Cardiovascular


Rhythm: regular


Heart sounds: normal: S1, S2


Abnormal Heart Sounds: systolic murmur, no S3 Gallop





- Gastrointestinal


General gastrointestinal: normal bowel sounds, soft, no splenomegaly, no 

tenderness, no umbilical hernia





- Integumentary


Integumentary: normal, normal turgor





- Neurologic


Neurologic: CNII-XII intact





- Musculoskeletal


Musculoskeletal: strength equal bilaterally





- Psychiatric


Psychiatric: A&O x's 3, appropriate affect, intact judgment & insight





Results


CBC & Chem 7: 


 18 11:16





 18 11:16


Labs: 


 Abnormal Lab Results - Last 24 Hours (Table)











  18 Range/Units





  11:16 11:16 


 


WBC   13.1 H  (3.8-10.6)  k/uL


 


Neutrophils #   10.7 H  (1.3-7.7)  k/uL


 


Glucose  109 H   (74-99)  mg/dL














Thrombosis Risk Factor Assmnt





- DVT/VTE Prophylaxis


DVT/VTE Prophylaxis: Pharmacologic Prophylaxis ordered, Mechanical Prophylaxis 

ordered





Assessment and Plan


Assessment: 





Assessment and plan:





1.  Acute COPD exacerbation with acute bronchitis.  DuoNeb 3 mg nebulization 4 

times every day, Pulmicort 1 mg nebulization twice every day, continue 

doxycycline 100 mg orally twice every day, Solu-Medrol 60 mg IV push every 6 

hours, pulmonary consultation is in place.





2.  Left-sided chest pain.  Patient will have an echocardiogram and he would be 

seen in consultation by cardiology.





3.  Chronic tobacco use and dependence.  Smoking cessation and counseling an 

increased risk of CAD, CVA, and malignancy.





4.  Prior history of alcohol abuse.  Patient stated that he quit many years ago.





5.  History of hyperparathyroidism status post surgery.





6.  DVT prophylaxis.  Continue patient on heparin 5000 units subcutaneous every 

12 hours.





7.  GI prophylaxis.  Continue Protonix 40 mg orally once every day.





8.  Admit to inpatient.





9.  Patient is full code.

## 2018-06-29 VITALS
DIASTOLIC BLOOD PRESSURE: 65 MMHG | HEART RATE: 82 BPM | RESPIRATION RATE: 18 BRPM | SYSTOLIC BLOOD PRESSURE: 122 MMHG | TEMPERATURE: 97 F

## 2018-06-29 LAB
ALBUMIN SERPL-MCNC: 3.2 G/DL (ref 3.5–5)
ALP SERPL-CCNC: 86 U/L (ref 38–126)
ALT SERPL-CCNC: 29 U/L (ref 21–72)
ANION GAP SERPL CALC-SCNC: 9 MMOL/L
AST SERPL-CCNC: 13 U/L (ref 17–59)
BASOPHILS # BLD AUTO: 0 K/UL (ref 0–0.2)
BASOPHILS NFR BLD AUTO: 0 %
BUN SERPL-SCNC: 14 MG/DL (ref 9–20)
CALCIUM SPEC-MCNC: 8.6 MG/DL (ref 8.4–10.2)
CHLORIDE SERPL-SCNC: 105 MMOL/L (ref 98–107)
CO2 SERPL-SCNC: 24 MMOL/L (ref 22–30)
EOSINOPHIL # BLD AUTO: 0 K/UL (ref 0–0.7)
EOSINOPHIL NFR BLD AUTO: 0 %
ERYTHROCYTE [DISTWIDTH] IN BLOOD BY AUTOMATED COUNT: 4.17 M/UL (ref 4.3–5.9)
ERYTHROCYTE [DISTWIDTH] IN BLOOD: 13 % (ref 11.5–15.5)
GLUCOSE BLD-MCNC: 156 MG/DL (ref 75–99)
GLUCOSE BLD-MCNC: 160 MG/DL (ref 75–99)
GLUCOSE SERPL-MCNC: 152 MG/DL (ref 74–99)
HCT VFR BLD AUTO: 40.2 % (ref 39–53)
HGB BLD-MCNC: 13.2 GM/DL (ref 13–17.5)
LYMPHOCYTES # SPEC AUTO: 0.8 K/UL (ref 1–4.8)
LYMPHOCYTES NFR SPEC AUTO: 4 %
MCH RBC QN AUTO: 31.6 PG (ref 25–35)
MCHC RBC AUTO-ENTMCNC: 32.8 G/DL (ref 31–37)
MCV RBC AUTO: 96.5 FL (ref 80–100)
MONOCYTES # BLD AUTO: 0.6 K/UL (ref 0–1)
MONOCYTES NFR BLD AUTO: 3 %
NEUTROPHILS # BLD AUTO: 17.5 K/UL (ref 1.3–7.7)
NEUTROPHILS NFR BLD AUTO: 92 %
PLATELET # BLD AUTO: 260 K/UL (ref 150–450)
POTASSIUM SERPL-SCNC: 4 MMOL/L (ref 3.5–5.1)
PROT SERPL-MCNC: 5.3 G/DL (ref 6.3–8.2)
SODIUM SERPL-SCNC: 138 MMOL/L (ref 137–145)
WBC # BLD AUTO: 19 K/UL (ref 3.8–10.6)

## 2018-06-29 RX ADMIN — IPRATROPIUM BROMIDE AND ALBUTEROL SULFATE SCH: .5; 3 SOLUTION RESPIRATORY (INHALATION) at 15:31

## 2018-06-29 RX ADMIN — HEPARIN SODIUM SCH UNIT: 5000 INJECTION, SOLUTION INTRAVENOUS; SUBCUTANEOUS at 09:49

## 2018-06-29 RX ADMIN — BUDESONIDE SCH MG: 0.5 INHALANT ORAL at 07:10

## 2018-06-29 RX ADMIN — IPRATROPIUM BROMIDE AND ALBUTEROL SULFATE SCH ML: .5; 3 SOLUTION RESPIRATORY (INHALATION) at 07:10

## 2018-06-29 RX ADMIN — INSULIN ASPART SCH: 100 INJECTION, SOLUTION INTRAVENOUS; SUBCUTANEOUS at 11:54

## 2018-06-29 RX ADMIN — HEPARIN SODIUM SCH: 5000 INJECTION, SOLUTION INTRAVENOUS; SUBCUTANEOUS at 09:50

## 2018-06-29 RX ADMIN — METHYLPREDNISOLONE SODIUM SUCCINATE SCH MG: 125 INJECTION, POWDER, FOR SOLUTION INTRAMUSCULAR; INTRAVENOUS at 06:15

## 2018-06-29 RX ADMIN — INSULIN ASPART SCH: 100 INJECTION, SOLUTION INTRAVENOUS; SUBCUTANEOUS at 09:49

## 2018-06-29 RX ADMIN — IPRATROPIUM BROMIDE AND ALBUTEROL SULFATE SCH: .5; 3 SOLUTION RESPIRATORY (INHALATION) at 10:58

## 2018-06-29 RX ADMIN — DOXYCYCLINE SCH MG: 100 CAPSULE ORAL at 09:49

## 2018-06-29 NOTE — P.PN
Subjective


Progress Note Date: 06/29/18





This is a 53-year-old  male one of Dr. Szymanski with a previous 

medical history significant for chronic tobacco use and dependence with the 

chronic COPD, patient developed to have left-sided chest pain last Friday and 

he ended up coming to the emergency department at [facility] 

yesterday and the day before yesterday and he was complaining of some headache 

at the same time he ended up having computed tomography scan of the brain that 

showed no evidence of acute of normalities, chest x-ray showed the pleural 

reaction without any evidence of acute pulmonary disease with the normal heart, 

patient was given oral antibiotic and the however the patient never took the 

medication he did receive a shot of morphine in the ER and he was sent home on 

patient today was driving to go down to [place] for evaluation according to the 

patient and that he called Dr. TIERRA Moss's office and he asked him to go to the 

ER at the [facility] where he was seen and the patient labs were okay 

patient was seen in the emergency department and he was seen earlier bipolar 

medicine he was recommended for the patient to be admitted to the hospital for 

evaluation by pulmonary as well as by cardiology due to his chest pain patient 

EKG did not show any evidence of acute abnormalities and no ST-T wave changes.





6/29: Patient is feeling a lot better today he denies any chest pain, or 

shortness breath, he has no coughing, no abdominal pain, he has no nausea 

vomiting or diarrhea, he underwent echocardiogram yesterday that was still 

pending at time of dictation.





Objective





- Vital Signs


Vital signs: 


 Vital Signs











Temp  97.8 F   06/29/18 06:15


 


Pulse  70   06/29/18 07:24


 


Resp  16   06/29/18 06:15


 


BP  126/67   06/29/18 06:15


 


Pulse Ox  96   06/29/18 07:12








 Intake & Output











 06/28/18 06/29/18 06/29/18





 18:59 06:59 18:59


 


Intake Total 400 200 


 


Balance 400 200 


 


Weight 65.771 kg  


 


Intake:   


 


  Oral 400 200 


 


Other:   


 


  Voiding Method  Toilet 





  Urinal 


 


  # Voids  1 














- Exam





- Constitutional


General appearance: average body habitus, mild distress





- EENT


Eyes: anicteric sclerae, EOMI, PERRLA, no ptosis, no scleral icterus, normal 

appearance


ENT: hearing grossly normal, NA/AT, normal oropharynx, no thrush


Ears: bilateral: normal





- Neck


Neck: no lymphadenopathy, normal ROM, no rigidity, no stridor, no thyromegaly


Carotids: bilateral: upstroke normal


Thyroid: bilateral: normal size





- Respiratory


Respiratory: bilateral: diminished, wheezing, prolonged expiration, negative: 

dullness, rales, rhonchi





- Cardiovascular


Rhythm: regular


Heart sounds: normal: S1, S2


Abnormal Heart Sounds: systolic murmur, no S3 Gallop





- Gastrointestinal


General gastrointestinal: normal bowel sounds, soft, no splenomegaly, no 

tenderness, no umbilical hernia





- Integumentary


Integumentary: normal, normal turgor





- Neurologic


Neurologic: CNII-XII intact





- Musculoskeletal


Musculoskeletal: strength equal bilaterally





- Psychiatric


Psychiatric: A&O x's 3, appropriate affect, intact judgment & insight





- Labs


CBC & Chem 7: 


 06/29/18 07:15





 06/29/18 07:15


Labs: 


 Abnormal Lab Results - Last 24 Hours (Table)











  06/28/18 06/28/18 06/28/18 Range/Units





  11:16 11:16 20:24 


 


WBC   13.1 H   (3.8-10.6)  k/uL


 


RBC     (4.30-5.90)  m/uL


 


Neutrophils #   10.7 H   (1.3-7.7)  k/uL


 


Lymphocytes #     (1.0-4.8)  k/uL


 


Glucose  109 H    (74-99)  mg/dL


 


POC Glucose (mg/dL)    139 H  (75-99)  mg/dL


 


AST     (17-59)  U/L


 


Total Protein     (6.3-8.2)  g/dL


 


Albumin     (3.5-5.0)  g/dL














  06/29/18 06/29/18 06/29/18 Range/Units





  06:58 07:15 07:15 


 


WBC   19.0 H   (3.8-10.6)  k/uL


 


RBC   4.17 L   (4.30-5.90)  m/uL


 


Neutrophils #   17.5 H   (1.3-7.7)  k/uL


 


Lymphocytes #   0.8 L   (1.0-4.8)  k/uL


 


Glucose    152 H  (74-99)  mg/dL


 


POC Glucose (mg/dL)  156 H    (75-99)  mg/dL


 


AST    13 L  (17-59)  U/L


 


Total Protein    5.3 L  (6.3-8.2)  g/dL


 


Albumin    3.2 L  (3.5-5.0)  g/dL














Assessment and Plan


Assessment: 





 Assessment and Plan 


Assessment: 





Assessment and plan:





1.  Acute COPD exacerbation with acute bronchitis.  DuoNeb 3 mg nebulization 4 

times every day, Pulmicort 1 mg nebulization twice every day, continue 

doxycycline 100 mg orally twice every day, Solu-Medrol 60 mg IV push every 6 

hours, pulmonary consultation is in place.





2.  Left-sided chest pain.  Patient will have an echocardiogram, and based on 

the result of the echocardiogram he may need to see cardiology.





3.  Chronic tobacco use and dependence.  Smoking cessation and counseling an 

increased risk of CAD, CVA, and malignancy.





4.  Prior history of alcohol abuse.  Patient stated that he quit many years ago.





5.  History of hyperparathyroidism status post surgery.





6.  DVT prophylaxis.  Continue patient on heparin 5000 units subcutaneous every 

12 hours.





7.  GI prophylaxis.  Continue Protonix 40 mg orally once every day.





8.  Home tomorrow morning.

## 2018-06-29 NOTE — P.PN
Subjective


Progress Note Date: 06/29/18





History of present illness: 





Jose Choi is a 53 year old male being seen and examined evaluated today while 

in the emergency room waiting for a bed inpatient.  The patient states he has 

been having some chest pressure along with significant shortness of breath that 

has been ongoing since last Saturday.  He states he was seen in the emergency 

room twice and discharged home with antibiotics and a steroid taper.  He states 

he has been having intermittent chest pain with numbness in his left arm as 

well.  He has been taking breathing treatments at home which have not been 

helping.  He does have a cough however has been unable to bring up any sputum 

due to thickness.  He denies any fevers states he has had some chills with 

lightheadedness.  Chest x-ray was reviewed and shows no acute process and 

chronic COPD.  Patient is also followed in the outpatient setting with our 

office as well.  He is a current 1 pack per day smoker for approximately 40 

years.  He did used to smoke 2 and half packs per day.  Upon examination he is 

lying in bed with family at bedside on room air.  Has shortness of breath with 

exertion and activity as well as a congested cough.  He is afebrile denies any 

further complaints.  All labs were reviewed, troponins were negative





Interval History:


6/29/18- patient is being seen examined and evaluated today on rounds.  He is 

resting up in bedside chair on room air.  States he is feeling significantly 

better today.  Echocardiogram was obtained yesterday and is currently pending.  

He states his shortness of breath is improving.  He still gets short of breath 

with some exertional activities.  States the breathing treatments are helping 

significantly.  He is afebrile no further complaints.





Objective





- Vital Signs


Vital signs: 


 Vital Signs











Temp  97.8 F   06/29/18 06:15


 


Pulse  70   06/29/18 07:24


 


Resp  16   06/29/18 06:15


 


BP  126/67   06/29/18 06:15


 


Pulse Ox  96   06/29/18 07:12








 Intake & Output











 06/28/18 06/29/18 06/29/18





 18:59 06:59 18:59


 


Intake Total 400 200 


 


Balance 400 200 


 


Weight 65.771 kg  


 


Intake:   


 


  Oral 400 200 


 


Other:   


 


  Voiding Method  Toilet 





  Urinal 


 


  # Voids  1 














- Exam





GENERAL EXAM: Alert, comfortable in no apparent distress.


HEAD: Normocephalic.


EYES: Normal reaction of pupils, equal size.


NOSE: Clear with pink turbinates.


THROAT: No erythema or exudates.


NECK: No masses, no JVD.


CHEST: No chest wall deformity.


LUNGS: Lungs noted to be coarse throughout with some expiratory wheezing.  

Bases diminished.


CVS: S1 and S2 normal with no audible mumurs, regular rhythm.


ABDOMEN: No hepatosplenomegaly, normal bowel sounds, no guarding or rigidity.


EXTREMITIES: No edema noted, pedal pulses palpable.


CENTRAL NERVOUS SYSTEM: No focal deficits, tone is normal in all 4 extremities.





- Labs


CBC & Chem 7: 


 06/29/18 07:15





 06/29/18 07:15


Labs: 


 Abnormal Lab Results - Last 24 Hours (Table)











  06/28/18 06/28/18 06/28/18 Range/Units





  11:16 11:16 20:24 


 


WBC   13.1 H   (3.8-10.6)  k/uL


 


RBC     (4.30-5.90)  m/uL


 


Neutrophils #   10.7 H   (1.3-7.7)  k/uL


 


Lymphocytes #     (1.0-4.8)  k/uL


 


Glucose  109 H    (74-99)  mg/dL


 


POC Glucose (mg/dL)    139 H  (75-99)  mg/dL


 


AST     (17-59)  U/L


 


Total Protein     (6.3-8.2)  g/dL


 


Albumin     (3.5-5.0)  g/dL














  06/29/18 06/29/18 06/29/18 Range/Units





  06:58 07:15 07:15 


 


WBC   19.0 H   (3.8-10.6)  k/uL


 


RBC   4.17 L   (4.30-5.90)  m/uL


 


Neutrophils #   17.5 H   (1.3-7.7)  k/uL


 


Lymphocytes #   0.8 L   (1.0-4.8)  k/uL


 


Glucose    152 H  (74-99)  mg/dL


 


POC Glucose (mg/dL)  156 H    (75-99)  mg/dL


 


AST    13 L  (17-59)  U/L


 


Total Protein    5.3 L  (6.3-8.2)  g/dL


 


Albumin    3.2 L  (3.5-5.0)  g/dL














Assessment and Plan


Assessment: 





Assessment





Acute exacerbation of COPD


Tracheobronchitis


Nicotine dependence


Chest pain








Plan





Medications have been reviewed and will be continued as ordered. 


Antibiotics: doxycycline


IV steroids, decreased


Echocardiogram pending


Continue with pulmonary hygiene, coughing and deep breathing exercises, and 

supportive care. 


Supplemental oxygen to maintain oxygen saturations of 92% or better. 


Continue nebulizer treatments.  Add budesonide


Obtain sputum culture


Repeat labs in the morning


Smoking cessation


GI and DVT prophylaxis. 


We will continue to monitor labs/results and adjust treatment as necessary. 


Further recommendations pending.





I performed an examination of the patient and discussed their management with 

the nurse practitioner. I have reviewed the nurse practitioner's note and agree 

with the documented findings and plan of care.

## 2019-01-26 ENCOUNTER — HOSPITAL ENCOUNTER (OUTPATIENT)
Dept: HOSPITAL 47 - EC | Age: 54
Setting detail: OBSERVATION
LOS: 1 days | Discharge: HOME | End: 2019-01-27
Attending: HOSPITALIST | Admitting: HOSPITALIST
Payer: COMMERCIAL

## 2019-01-26 VITALS — RESPIRATION RATE: 18 BRPM

## 2019-01-26 DIAGNOSIS — R61: ICD-10-CM

## 2019-01-26 DIAGNOSIS — R07.2: ICD-10-CM

## 2019-01-26 DIAGNOSIS — Z88.6: ICD-10-CM

## 2019-01-26 DIAGNOSIS — R11.2: ICD-10-CM

## 2019-01-26 DIAGNOSIS — R07.89: Primary | ICD-10-CM

## 2019-01-26 DIAGNOSIS — R20.0: ICD-10-CM

## 2019-01-26 DIAGNOSIS — F17.200: ICD-10-CM

## 2019-01-26 DIAGNOSIS — R03.0: ICD-10-CM

## 2019-01-26 DIAGNOSIS — M15.9: ICD-10-CM

## 2019-01-26 DIAGNOSIS — Z79.899: ICD-10-CM

## 2019-01-26 DIAGNOSIS — J44.9: ICD-10-CM

## 2019-01-26 DIAGNOSIS — R00.2: ICD-10-CM

## 2019-01-26 DIAGNOSIS — E21.3: ICD-10-CM

## 2019-01-26 PROCEDURE — 82150 ASSAY OF AMYLASE: CPT

## 2019-01-26 PROCEDURE — 82550 ASSAY OF CK (CPK): CPT

## 2019-01-26 PROCEDURE — 83690 ASSAY OF LIPASE: CPT

## 2019-01-26 PROCEDURE — 84484 ASSAY OF TROPONIN QUANT: CPT

## 2019-01-26 PROCEDURE — 87503 INFLUENZA DNA AMP PROB ADDL: CPT

## 2019-01-26 PROCEDURE — 87502 INFLUENZA DNA AMP PROBE: CPT

## 2019-01-26 PROCEDURE — 83735 ASSAY OF MAGNESIUM: CPT

## 2019-01-26 PROCEDURE — 82553 CREATINE MB FRACTION: CPT

## 2019-01-26 PROCEDURE — 83880 ASSAY OF NATRIURETIC PEPTIDE: CPT

## 2019-01-26 PROCEDURE — 80320 DRUG SCREEN QUANTALCOHOLS: CPT

## 2019-01-26 PROCEDURE — 85730 THROMBOPLASTIN TIME PARTIAL: CPT

## 2019-01-26 PROCEDURE — 80053 COMPREHEN METABOLIC PANEL: CPT

## 2019-01-26 PROCEDURE — 84443 ASSAY THYROID STIM HORMONE: CPT

## 2019-01-26 PROCEDURE — 85025 COMPLETE CBC W/AUTO DIFF WBC: CPT

## 2019-01-26 PROCEDURE — 71046 X-RAY EXAM CHEST 2 VIEWS: CPT

## 2019-01-26 PROCEDURE — 99291 CRITICAL CARE FIRST HOUR: CPT

## 2019-01-26 PROCEDURE — 84100 ASSAY OF PHOSPHORUS: CPT

## 2019-01-26 NOTE — XR
EXAMINATION TYPE: XR chest 2V

 

DATE OF EXAM: 1/26/2019

 

HISTORY: Chest Pain.

 

REFERENCE: Previous study dated 6/28/2018.

 

FINDINGS: The lungs are overinflated. The heart is not enlarged. There are chronic increased markings
 throughout the lungs. There is a dextroscoliosis. There is chronic blunting of the right CP angle.

 

IMPRESSION: 

1. COPD.

2. DEXTROSCOLIOSIS.

3. I COULD NOT EXCLUDE A SMALL, RIGHT-SIDED PLEURAL EFFUSION.

## 2019-01-26 NOTE — ED
Chest Pain HPI





- General


Chief Complaint: Chest Pain


Stated Complaint: vomiting, lt arm numbness


Time Seen by Provider: 01/26/19 11:50


Source: patient, RN notes reviewed, old records reviewed


Mode of arrival: wheelchair


Limitations: no limitations





- History of Present Illness


Initial Comments: 





This is a 33-year-old male the ER for evaluation presents today for evaluation 

of shortness of breath difficulty breathing and chest pain with history of COPD 

high blood pressure.  No recent cardiac evaluation.  No recent travel history 

no known sick contacts.


MD Complaint: chest pain


-: minutes(s)


Onset: during rest


Pain Location: substernal


Pain Radiation: LUE


Severity: mild


Severity scale (1-10): 1


Quality: heaviness


Consistency: constant


Improves With: nothing


Worsens With: nothing


Anginal Symptoms: diaphoresis


Other Symptoms: palpitations


Treatments Prior to Arrival: none





- Related Data


 Home Medications











 Medication  Instructions  Recorded  Confirmed


 


Albuterol Inhaler [Ventolin Hfa 2 puff INHALATION RT-BID 01/26/19 01/26/19





Inhaler]   


 


Albuterol Nebulized (Conc) 2.5 mg PO RT-DAILY PRN 01/26/19 01/26/19





[Ventolin Nebulized (Conc)]   











 Allergies











Allergy/AdvReac Type Severity Reaction Status Date / Time


 


aspirin Allergy  Rash/Hives Verified 01/26/19 21:30














Review of Systems


ROS Statement: 


Those systems with pertinent positive or pertinent negative responses have been 

documented in the HPI.





ROS Other: All systems not noted in ROS Statement are negative.





EKG Findings





- EKG Comments:


EKG Findings:: EKG shows sinus rhythm rate of 62, , QRS 90, QTc 414





Past Medical History


Past Medical History: COPD, Osteoarthritis (OA)


Additional Past Medical History / Comment(s): Hyperparathyroidism status post 

parathyroidectomy.


History of Any Multi-Drug Resistant Organisms: None Reported


Past Surgical History: No Surgical Hx Reported


Additional Past Surgical History / Comment(s): parathyroidectomy and sinus 

surgery.


Past Anesthesia/Blood Transfusion Reactions: No Reported Reaction


Past Psychological History: No Psychological Hx Reported


Smoking Status: Current every day smoker


Past Alcohol Use History: None Reported


Past Drug Use History: None Reported





- Past Family History


  ** Father


Family Medical History: No Reported History





  ** Mother


Family Medical History: No Reported History





  ** Brother(s)


Family Medical History: Unable to Obtain





  ** Sister(s)


Family Medical History: Unable to Obtain





  ** Son(s)


Family Medical History: No Reported History





General Exam


Limitations: no limitations


General appearance: alert, in no apparent distress


Head exam: Present: atraumatic, normocephalic, normal inspection


Eye exam: Present: normal appearance, PERRL, EOMI.  Absent: scleral icterus, 

conjunctival injection, periorbital swelling


ENT exam: Present: normal exam, mucous membranes moist


Neck exam: Present: normal inspection.  Absent: tenderness, meningismus, 

lymphadenopathy


Respiratory exam: Present: normal lung sounds bilaterally.  Absent: respiratory 

distress, wheezes, rales, rhonchi, stridor


Cardiovascular Exam: Present: regular rate, normal rhythm, normal heart sounds.

  Absent: systolic murmur, diastolic murmur, rubs, gallop, clicks


GI/Abdominal exam: Present: soft, normal bowel sounds.  Absent: distended, 

tenderness, guarding, rebound, rigid


Extremities exam: Present: normal inspection, full ROM, normal capillary 

refill.  Absent: tenderness, pedal edema, joint swelling, calf tenderness


Back exam: Present: normal inspection


Neurological exam: Present: alert, oriented X3, CN II-XII intact


Psychiatric exam: Present: normal affect, normal mood


Skin exam: Present: warm, dry, intact, normal color.  Absent: rash





Course


 Vital Signs











  01/26/19





  11:45


 


Temperature 97.7 F


 


Pulse Rate 77


 


Respiratory 18





Rate 


 


Blood Pressure 148/82


 


O2 Sat by Pulse 98





Oximetry 














Chest Pain MDM





- MDM





53 male the ER for evasive chest pain, will be admitted for chest pain 

observation.  Negative troponin negative EKG here in the emergency department





Critical Care Time


Critical Care Time: Yes


Total Critical Care Time: 31





Disposition


Clinical Impression: 


 Acute bronchitis with COPD, Chest pain





Disposition: ADMITTED AS IP TO THIS HOSP


Condition: Undetermined


Is patient prescribed a controlled substance at d/c from ED?: No

## 2019-01-27 VITALS — SYSTOLIC BLOOD PRESSURE: 135 MMHG | HEART RATE: 60 BPM | DIASTOLIC BLOOD PRESSURE: 78 MMHG | TEMPERATURE: 97.5 F

## 2019-01-27 LAB
ALBUMIN SERPL-MCNC: 4.3 G/DL (ref 3.5–5)
ALP SERPL-CCNC: 70 U/L (ref 38–126)
ALT SERPL-CCNC: 22 U/L (ref 21–72)
AMYLASE SERPL-CCNC: 134 U/L (ref 30–110)
ANION GAP SERPL CALC-SCNC: 8 MMOL/L
AST SERPL-CCNC: 23 U/L (ref 17–59)
BASOPHILS # BLD AUTO: 0.1 K/UL (ref 0–0.2)
BASOPHILS NFR BLD AUTO: 1 %
BUN SERPL-SCNC: 12 MG/DL (ref 9–20)
CALCIUM SPEC-MCNC: 9.4 MG/DL (ref 8.4–10.2)
CHLORIDE SERPL-SCNC: 104 MMOL/L (ref 98–107)
CK SERPL-CCNC: 80 U/L (ref 55–170)
CK SERPL-CCNC: 84 U/L (ref 55–170)
CK SERPL-CCNC: 84 U/L (ref 55–170)
CO2 SERPL-SCNC: 26 MMOL/L (ref 22–30)
EOSINOPHIL # BLD AUTO: 0.2 K/UL (ref 0–0.7)
EOSINOPHIL NFR BLD AUTO: 2 %
ERYTHROCYTE [DISTWIDTH] IN BLOOD BY AUTOMATED COUNT: 5.09 M/UL (ref 4.3–5.9)
ERYTHROCYTE [DISTWIDTH] IN BLOOD: 12.7 % (ref 11.5–15.5)
GLUCOSE SERPL-MCNC: 119 MG/DL (ref 74–99)
HCT VFR BLD AUTO: 48.2 % (ref 39–53)
HGB BLD-MCNC: 16.1 GM/DL (ref 13–17.5)
LIPASE SERPL-CCNC: 257 U/L (ref 23–300)
LYMPHOCYTES # SPEC AUTO: 2.1 K/UL (ref 1–4.8)
LYMPHOCYTES NFR SPEC AUTO: 24 %
MAGNESIUM SPEC-SCNC: 1.8 MG/DL (ref 1.6–2.3)
MCH RBC QN AUTO: 31.6 PG (ref 25–35)
MCHC RBC AUTO-ENTMCNC: 33.4 G/DL (ref 31–37)
MCV RBC AUTO: 94.6 FL (ref 80–100)
MONOCYTES # BLD AUTO: 0.5 K/UL (ref 0–1)
MONOCYTES NFR BLD AUTO: 5 %
NEUTROPHILS # BLD AUTO: 5.7 K/UL (ref 1.3–7.7)
NEUTROPHILS NFR BLD AUTO: 66 %
PLATELET # BLD AUTO: 307 K/UL (ref 150–450)
POTASSIUM SERPL-SCNC: 4.3 MMOL/L (ref 3.5–5.1)
PROT SERPL-MCNC: 7.1 G/DL (ref 6.3–8.2)
SODIUM SERPL-SCNC: 138 MMOL/L (ref 137–145)
TROPONIN I SERPL-MCNC: <0.012 NG/ML (ref 0–0.03)
WBC # BLD AUTO: 8.6 K/UL (ref 3.8–10.6)

## 2019-01-27 NOTE — P.CRDCN
History of Present Illness


Consult date: 01/27/19


Chief complaint: Chest pain


History of present illness: 





This is a pleasant 53-year-old gentleman with a past medical history 

significant for chronic obstructive pulmonary disease and ongoing smoking 

presented to the emergency room complaining of chest discomfort.





In July 2018, he was admitted to the hospital with chest discomfort felt to be 

atypical and at that point the patient underwent an echocardiogram which 

revealed normal LV function without any significant valvular abnormalities.  At 

that point the patient was discharged home in stable medical condition.





This time he was in his usual state of health until yesterday when he was 

sitting home drinking coffee and started experiencing discomfort in the chest, 

on the left side of the chest, without any radiation to the arms or neck or 

shoulders and without any associated symptoms of shortness of breath, dizziness

, sweating, nausea or vomiting, or syncope.  Because of that, the patient 

decided to come to the emergency room.





The cardiac workup overall is unremarkable.  The chest x-ray showed COPD.  The 

EKG showed sinus rhythm without any ischemic ST or T-wave abnormalities but 

early repolarization.  The cardiac enzymes checked and came in to be 

unremarkable.














Past Medical History


Past Medical History: COPD, Osteoarthritis (OA)


Additional Past Medical History / Comment(s): Hyperparathyroidism status post 

parathyroidectomy.


History of Any Multi-Drug Resistant Organisms: None Reported


Past Surgical History: No Surgical Hx Reported


Additional Past Surgical History / Comment(s): parathyroidectomy and sinus 

surgery.


Past Anesthesia/Blood Transfusion Reactions: No Reported Reaction


Past Psychological History: No Psychological Hx Reported


Smoking Status: Current every day smoker


Past Alcohol Use History: None Reported


Past Drug Use History: None Reported





- Past Family History


  ** Father


Family Medical History: No Reported History





  ** Mother


Family Medical History: No Reported History





  ** Brother(s)


Family Medical History: Unable to Obtain





  ** Sister(s)


Family Medical History: Unable to Obtain





  ** Son(s)


Family Medical History: No Reported History





Medications and Allergies


 Home Medications











 Medication  Instructions  Recorded  Confirmed  Type


 


Albuterol Inhaler [Ventolin Hfa 2 puff INHALATION RT-BID 01/26/19 01/26/19 

History





Inhaler]    


 


Albuterol Nebulized (Conc) 2.5 mg PO RT-DAILY PRN 01/26/19 01/26/19 History





[Ventolin Nebulized (Conc)]    











 Allergies











Allergy/AdvReac Type Severity Reaction Status Date / Time


 


aspirin Allergy  Rash/Hives Verified 01/26/19 21:30














Physical Exam


Vitals: 


 Vital Signs











  Temp Pulse Pulse Resp BP BP BP


 


 01/27/19 07:05  97.9 F   60  18    107/71


 


 01/27/19 04:25  97.7 F   58 L  18   108/67 


 


 01/27/19 03:28     18   


 


 01/27/19 00:00     18   


 


 01/26/19 23:32  98.5 F   70  18   120/62 


 


 01/26/19 20:00  98.3 F   65  18   109/70 


 


 01/26/19 11:45  97.7 F  77   18  148/82  














  Pulse Ox


 


 01/27/19 07:05  96


 


 01/27/19 04:25  95


 


 01/27/19 03:28 


 


 01/27/19 00:00 


 


 01/26/19 23:32  95


 


 01/26/19 20:00  93 L


 


 01/26/19 11:45  98








 Intake and Output











 01/26/19 01/27/19 01/27/19





 22:59 06:59 14:59


 


Other:   


 


  # Voids  2 














- Constitutional


General appearance: no acute distress





- Respiratory


Respiratory: bilateral: wheezing





- Cardiovascular


Rhythm: regular


Heart sounds: normal: S1, S2





Results





 01/26/19 12:10





 Cardiac Enzymes











  01/26/19 01/26/19 Range/Units





  12:10 23:37 


 


CK-MB (CK-2)  1.2  1.1  (0.0-2.4)  ng/mL


 


Troponin I  <0.012  <0.012  (0.000-0.034)  ng/mL








 Coagulation











  01/26/19 01/27/19 Range/Units





  23:37 07:18 


 


APTT  39.8 H  46.2 H  (22.0-30.0)  sec








 CBC











  01/26/19 Range/Units





  12:10 


 


WBC  8.6  (3.8-10.6)  k/uL


 


RBC  5.09  (4.30-5.90)  m/uL


 


Hgb  16.1  (13.0-17.5)  gm/dL


 


Hct  48.2  (39.0-53.0)  %


 


Plt Count  307  (150-450)  k/uL








 Current Medications











Generic Name Dose Route Start Last Admin





  Trade Name Freq  PRN Reason Stop Dose Admin


 


Cyclobenzaprine HCl  10 mg  01/26/19 21:27  





  Flexeril  PO   





  TID PRN   





  MUSCLE SPASM   





     





     





     


 


Sodium Chloride  1,000 mls @ 100 mls/hr  01/27/19 01:00  





  Saline 0.9%  IV   





  .Q10H MIKI   





     





     





     





     


 


Morphine Sulfate  4 mg  01/27/19 00:46  





  Morphine Sulfate (Inj)  IVP   





  Q4HR PRN   





  Pain   





     





     





     


 


Ondansetron HCl  4 mg  01/27/19 00:47  





  Zofran  IVP   





  Q8H PRN   





  Nausea And Vomiting   





     





     





     








 Intake and Output











 01/26/19 01/27/19 01/27/19





 22:59 06:59 14:59


 


Other:   


 


  # Voids  2 








 





 01/26/19 12:10 











Assessment and Plan


Assessment: 





Assessment


#1 atypical chest discomfort.  The patient currently is chest pain-free


#2 chronic obstructive pulmonary disease


#3 significant history of smoking





Plan


#1 acute coronary syndrome was ruled out.


#2 severe underlying coronary artery disease to be ruled out.  The patient need 

to have a stress test.  He would rather have it done as an outpatient.


#3 I will DC the heparin IV


#4 get the patient up and around, if he is asymptomatic, he might be able to be 

discharged home and have a stress test as an outpatient.





Thank you for allowing us participate in his care.

## 2019-01-27 NOTE — P.HPIM
History of Present Illness


H&P Date: 01/27/19


Chief Complaint: Chest pain





Patient is a 53-year-old male with known history of COPD on follow-up with Dr. Moss, osteoarthritis, hyperparathyroidism status post parathyroidectomy, 

nicotine addiction came to ER with complaints of chest pain mainly the left 

retrosternal.  No radiation.  Denied any associated shortness of breath.  No 

nausea vomiting or abdominal pain.  Patient says that he did have 2-3 episodes 

of vomiting yesterday.  Patient came to ER with further evolution of chest pain.


No fever no chills.  Patient does have cough which is dry.  No fever no chills 

at home.  Denied any unusual food intake.  He does use inhalers at home for 

COPD.


EKG showed normal sinus rhythm


Chest x-ray showed COPD, dextroscoliosis, small right-sided pleural effusion 

cannot be excluded


Troponin 3 negative


Amylase 134 lipase 257


Patient had 2-D echocardiogram in July 2018 showed normal ejection fraction and 

no significant valvular abnormalities.











Review of Systems





Constitutional: Patient denies any fever or chills .  No generalized weakness 

or weight loss.  


Abdomen: Patient denied nausea vomiting and diarrhea and abdominal pain.


Cardiovascular: Patient denies any chest pain or short of breath no 

palpitations.


Respiratory: patient denied any cough is from production.  No shortness of 

breath


Neurologic: Patient denied any numbness or tingling headache.


Musculoskeletal: Patient denies any complaints of joint swelling or deformity.


Skin: Negative


Psychiatric: Negative


Endocrine: No heat or cold intolerance.  No recent weight gain.


Genitourinary: No dysuria or hematuria.


All other 14 point ROS negative except the above





Past Medical History


Past Medical History: COPD, Osteoarthritis (OA)


Additional Past Medical History / Comment(s): Hyperparathyroidism status post 

parathyroidectomy.


History of Any Multi-Drug Resistant Organisms: None Reported


Past Surgical History: No Surgical Hx Reported


Additional Past Surgical History / Comment(s): parathyroidectomy and sinus 

surgery.


Past Anesthesia/Blood Transfusion Reactions: No Reported Reaction


Past Psychological History: No Psychological Hx Reported


Smoking Status: Current every day smoker


Past Alcohol Use History: None Reported


Past Drug Use History: None Reported





- Past Family History


  ** Father


Family Medical History: No Reported History





  ** Mother


Family Medical History: No Reported History





  ** Brother(s)


Family Medical History: Unable to Obtain





  ** Sister(s)


Family Medical History: Unable to Obtain





  ** Son(s)


Family Medical History: No Reported History





Medications and Allergies


 Home Medications











 Medication  Instructions  Recorded  Confirmed  Type


 


Albuterol Inhaler [Ventolin Hfa 2 puff INHALATION RT-BID 01/26/19 01/26/19 

History





Inhaler]    


 


Albuterol Nebulized (Conc) 2.5 mg PO RT-DAILY PRN 01/26/19 01/26/19 History





[Ventolin Nebulized (Conc)]    











 Allergies











Allergy/AdvReac Type Severity Reaction Status Date / Time


 


aspirin Allergy  Rash/Hives Verified 01/26/19 21:30














Physical Exam


Vitals: 


 Vital Signs











  Temp Pulse Resp BP BP Pulse Ox


 


 01/27/19 12:00   62  18   


 


 01/27/19 11:25  98.3 F  62  18   117/72  96


 


 01/27/19 08:00   60  18   


 


 01/27/19 07:05  97.9 F  60  18   107/71  96


 


 01/27/19 04:25  97.7 F  58 L  18  108/67   95


 


 01/27/19 03:28    18   


 


 01/27/19 00:00    18   


 


 01/26/19 23:32  98.5 F  70  18  120/62   95


 


 01/26/19 20:00  98.3 F  65  18  109/70   93 L








 Intake and Output











 01/26/19 01/27/19 01/27/19





 22:59 06:59 14:59


 


Intake Total   436


 


Balance   436


 


Intake:   


 


  Oral   236


 


  Other   200


 


Other:   


 


  # Voids  2 














PHYSICAL EXAMINATION: 


Patient is lying in the bed comfortably, no acute distress, awake alert and 

oriented.. 


HEENT: Normocephalic. Neck is supple. Pupils reactive. Nostrils clear. Oral 

cavity is moist. Ears reveal no drainage. 


Neck reveals no JVD, carotid bruits, or thyromegaly. 


CHEST EXAMINATION: Trachea is central. Symmetrical expansion.  biLateral slow 

air entry.  No wheezing no crackles.. 


CARDIAC: Normal S1, S2 with no gallops. No murmurs 


ABDOMEN: Soft. Bowel sounds normal. No organomegaly. No abdominal bruits. 


Extremities: reveal no edema.  No clubbing or cyanosis


Neurologically awake, alert, oriented x3 with well-coordinated movements.  No 

focal deficits noted


Skin: No rash or skin lesions. 


Psychiatric: Coperative.  Nonsuicidal


Musculoskeletal: No joint swelling or deformity.  Normal range of motion.








Results


CBC & Chem 7: 


 01/26/19 12:10





 01/26/19 12:10


Labs: 


 Abnormal Lab Results - Last 24 Hours (Table)











  01/26/19 01/26/19 01/27/19 Range/Units





  12:10 23:37 07:18 


 


APTT   39.8 H  46.2 H  (22.0-30.0)  sec


 


Glucose  119 H    (74-99)  mg/dL


 


Amylase  134 H    ()  U/L














Thrombosis Risk Factor Assmnt





- DVT/VTE Prophylaxis


DVT/VTE Prophylaxis: Pharmacologic Prophylaxis ordered





Assessment and Plan


Assessment: 





Atypical chest pain.  Ruled out ACS.


COPD not in exacerbation


Nicotine addiction


Nausea and vomiting improved now


Osteoarthritis of multiple joints


Hyperparathyroidism with history of parathyroidectomy


DVT prophylaxis





Plan:


Patient will be continued on telemetry monitoring.  Serial EKGs and troponins 

negative.  Patient did have previous echocardiogram from July 2018.  Patient 

will be continued on breathing treatments and pain management.  Cardiology 

recommends stress test which can be done as an outpatient.  Otherwise patient 

wants to be discharged home.


Smoking cessation has been counseled extensively.


\


Time with Patient: Greater than 30

## 2019-09-17 ENCOUNTER — HOSPITAL ENCOUNTER (OUTPATIENT)
Dept: HOSPITAL 47 - EC | Age: 54
Setting detail: OBSERVATION
LOS: 1 days | Discharge: HOME | End: 2019-09-18
Payer: COMMERCIAL

## 2019-09-17 VITALS — RESPIRATION RATE: 18 BRPM

## 2019-09-17 DIAGNOSIS — Z88.6: ICD-10-CM

## 2019-09-17 DIAGNOSIS — R07.89: Primary | ICD-10-CM

## 2019-09-17 DIAGNOSIS — M19.90: ICD-10-CM

## 2019-09-17 DIAGNOSIS — Z91.018: ICD-10-CM

## 2019-09-17 DIAGNOSIS — R42: ICD-10-CM

## 2019-09-17 DIAGNOSIS — F17.210: ICD-10-CM

## 2019-09-17 DIAGNOSIS — E89.2: ICD-10-CM

## 2019-09-17 DIAGNOSIS — I10: ICD-10-CM

## 2019-09-17 DIAGNOSIS — Z79.899: ICD-10-CM

## 2019-09-17 DIAGNOSIS — J44.9: ICD-10-CM

## 2019-09-17 LAB
ALBUMIN SERPL-MCNC: 4.1 G/DL (ref 3.5–5)
ALP SERPL-CCNC: 66 U/L (ref 38–126)
ALT SERPL-CCNC: 17 U/L (ref 21–72)
ANION GAP SERPL CALC-SCNC: 8 MMOL/L
APTT BLD: 24.8 SEC (ref 22–30)
AST SERPL-CCNC: 17 U/L (ref 17–59)
BASOPHILS # BLD AUTO: 0.1 K/UL (ref 0–0.2)
BASOPHILS NFR BLD AUTO: 1 %
BUN SERPL-SCNC: 13 MG/DL (ref 9–20)
CALCIUM SPEC-MCNC: 9.1 MG/DL (ref 8.4–10.2)
CHLORIDE SERPL-SCNC: 100 MMOL/L (ref 98–107)
CO2 SERPL-SCNC: 30 MMOL/L (ref 22–30)
D DIMER PPP FEU-MCNC: 0.33 MG/L FEU (ref ?–0.6)
EOSINOPHIL # BLD AUTO: 0.3 K/UL (ref 0–0.7)
EOSINOPHIL NFR BLD AUTO: 3 %
ERYTHROCYTE [DISTWIDTH] IN BLOOD BY AUTOMATED COUNT: 5.3 M/UL (ref 4.3–5.9)
ERYTHROCYTE [DISTWIDTH] IN BLOOD: 12.6 % (ref 11.5–15.5)
GLUCOSE SERPL-MCNC: 142 MG/DL (ref 74–99)
HCT VFR BLD AUTO: 48.6 % (ref 39–53)
HGB BLD-MCNC: 16.5 GM/DL (ref 13–17.5)
INR PPP: 0.9 (ref ?–1.2)
LYMPHOCYTES # SPEC AUTO: 2.5 K/UL (ref 1–4.8)
LYMPHOCYTES NFR SPEC AUTO: 24 %
MAGNESIUM SPEC-SCNC: 1.8 MG/DL (ref 1.6–2.3)
MCH RBC QN AUTO: 31.2 PG (ref 25–35)
MCHC RBC AUTO-ENTMCNC: 34 G/DL (ref 31–37)
MCV RBC AUTO: 91.6 FL (ref 80–100)
MONOCYTES # BLD AUTO: 0.6 K/UL (ref 0–1)
MONOCYTES NFR BLD AUTO: 6 %
NEUTROPHILS # BLD AUTO: 6.4 K/UL (ref 1.3–7.7)
NEUTROPHILS NFR BLD AUTO: 63 %
PLATELET # BLD AUTO: 271 K/UL (ref 150–450)
POTASSIUM SERPL-SCNC: 4.2 MMOL/L (ref 3.5–5.1)
PROT SERPL-MCNC: 6.7 G/DL (ref 6.3–8.2)
PT BLD: 10.2 SEC (ref 9–12)
SODIUM SERPL-SCNC: 138 MMOL/L (ref 137–145)
WBC # BLD AUTO: 10 K/UL (ref 3.8–10.6)

## 2019-09-17 PROCEDURE — 85049 AUTOMATED PLATELET COUNT: CPT

## 2019-09-17 PROCEDURE — 85730 THROMBOPLASTIN TIME PARTIAL: CPT

## 2019-09-17 PROCEDURE — 83735 ASSAY OF MAGNESIUM: CPT

## 2019-09-17 PROCEDURE — 80061 LIPID PANEL: CPT

## 2019-09-17 PROCEDURE — 94640 AIRWAY INHALATION TREATMENT: CPT

## 2019-09-17 PROCEDURE — 96366 THER/PROPH/DIAG IV INF ADDON: CPT

## 2019-09-17 PROCEDURE — 85379 FIBRIN DEGRADATION QUANT: CPT

## 2019-09-17 PROCEDURE — 93306 TTE W/DOPPLER COMPLETE: CPT

## 2019-09-17 PROCEDURE — 85610 PROTHROMBIN TIME: CPT

## 2019-09-17 PROCEDURE — 36415 COLL VENOUS BLD VENIPUNCTURE: CPT

## 2019-09-17 PROCEDURE — 71046 X-RAY EXAM CHEST 2 VIEWS: CPT

## 2019-09-17 PROCEDURE — 96376 TX/PRO/DX INJ SAME DRUG ADON: CPT

## 2019-09-17 PROCEDURE — 93005 ELECTROCARDIOGRAM TRACING: CPT

## 2019-09-17 PROCEDURE — 85025 COMPLETE CBC W/AUTO DIFF WBC: CPT

## 2019-09-17 PROCEDURE — 99285 EMERGENCY DEPT VISIT HI MDM: CPT

## 2019-09-17 PROCEDURE — 80053 COMPREHEN METABOLIC PANEL: CPT

## 2019-09-17 PROCEDURE — 83880 ASSAY OF NATRIURETIC PEPTIDE: CPT

## 2019-09-17 PROCEDURE — 83690 ASSAY OF LIPASE: CPT

## 2019-09-17 PROCEDURE — 84484 ASSAY OF TROPONIN QUANT: CPT

## 2019-09-17 PROCEDURE — 93351 STRESS TTE COMPLETE: CPT

## 2019-09-17 PROCEDURE — 96365 THER/PROPH/DIAG IV INF INIT: CPT

## 2019-09-17 RX ADMIN — HEPARIN SODIUM SCH MLS/HR: 10000 INJECTION, SOLUTION INTRAVENOUS at 12:23

## 2019-09-17 RX ADMIN — IPRATROPIUM BROMIDE AND ALBUTEROL SULFATE SCH ML: .5; 3 SOLUTION RESPIRATORY (INHALATION) at 14:02

## 2019-09-17 RX ADMIN — IPRATROPIUM BROMIDE AND ALBUTEROL SULFATE SCH ML: .5; 3 SOLUTION RESPIRATORY (INHALATION) at 16:30

## 2019-09-17 RX ADMIN — IPRATROPIUM BROMIDE AND ALBUTEROL SULFATE SCH ML: .5; 3 SOLUTION RESPIRATORY (INHALATION) at 19:26

## 2019-09-17 NOTE — P.CRDCN
History of Present Illness


History of present illness: 





This is a pleasant 54-year-old  male past medical history significant 

for COPD, hyperparathyroidism status post parathyroidectomy and chronic nicotine

dependence.  He denies prior history of coronary artery disease, hypertension, 

diabetes mellitus or dyslipidemia.  He does not see a cardiologist in the 

outpatient setting.  We have been asked him in consultation secondary to chest 

discomfort. He states for the last few days he has been experiencing a tightness

across his chest. There is no radiation to the arm, back, neck or jaw. At times 

the pain is felt worse in the left precordial region. His pain specifically woke

him up yesterday morning and was associated with shortness of breath and light 

headed like he was going to pass out. He got back into bed and rested. His pain 

slowly subsided. He also has been coughing more frequently. Typically he has a 

regular dry cough however in the last few days he has been bringing up 

white/clear sputum. He denies PND, orthopnea or fever/chills at home. Upon 

arrival blood pressure was elevated 175/81, he states his blood pressure does 

fluctuate however he does not take daily medications. He states he does not 

follow regularly with a physician. His shortness of breath did improve after 

DuoNeb treatment. His pain is somewhat reproducible, specifically he states his 

chest hurt when they were taking his echo. 





EKG reveals sinus mechanism with inferior Q-waves, no acute ST or T wave 

abnormalities noted.


Chest x-ray reveals hyperinflation suggestive of COPD and scoliosis with no 

acute cardiopulmonary process.


Laboratory data reviewed, CBC unremarkable, d-dimer 0.33, sodium 138, potassium 

4.2, creatinine 1.0, magnesium 1.8, cardiac enzymes negative 1, proBNP 70.


He takes no daily cardiac medications.


Most recent echocardiogram performed June 2018 reveals normal LV systolic 

function with ejection fraction 55-60%.





At the time of my exam:


CONSTITUTIONAL: Denies fever. Denies chills.


EYES: Denies blurred vision. Denies vision changes. Denies eye pain.


EARS, NOSE, MOUTH & THROAT: Denies headache. Denies sore throat. Denies ear 

pain.


CARDIOVASCULAR: Complains of intermittent pleuritic chest pain. Denies shortness

of breath. Denies orthopnea. Denies PND. Denies palpitations.


RESPIRATORY: Denies cough. 


GASTROINTESTINAL: Denies abdominal pain. Denies diarrhea. Denies constipation. 

Denies nausea. Denies vomiting.


MUSCULOSKELETAL: Denies myalgias.


INTEGUMENTARY: Denies pruitis. Denies rash.


NEUROLOGIC: Denies numbness. Denies tingling. Denies weakness.


PSYCHIATRIC: Denies anxiety. Denies depression.


ENDOCRINE: Denies fatigue. Denies weight change. Denies polydipsia. Denies 

polyurina.


GENITOURINARY: Denies burning, hematuria or urgency with micturation.


HEMATOLOGIC: Denies history of anemia. Denies bleeding. 





Blood pressure 134/87 heart rate 98 afebrile maintaining oxygen saturation on 

nasal cannula


GENERAL: This is a 54-year-old  male in no apparent distress at the 

time of my examination.


HEENT: Head is atraumatic, normocephalic. Pupils are equal, round. Sclerae 

anicteric. Conjunctivae are clear. Mucous membranes of the mouth are moist. Neck

is supple. There is no jugular venous distention. No carotid bruit is heard.


LUNGS: Expiratory wheeze, no rales or rhonchi. Diminished bilaterally. Mild 

chest wall tenderness is noted on palpation over the left precordial region. No 

pain with deep breathing.


HEART: Regular rate and rhythm without murmurs, rubs or gallops. S1 and S2 

heard.


ABDOMEN: Soft, nontender. Bowel sounds are heard. No organomegaly noted.


EXTREMITIES: No evidence of peripheral edema and no calf tenderness noted.


VASCULAR: Radial and dorsalis pedis pulses palpated, no evidence of clubbing.  


NEUROLOGIC: Patient is awake, alert and oriented x3.


 


ASSESSMENT


Chest pain, atypical. 


COPD


Hypertension


Chronic nicotine dependence


History of parathyroidectomy





PLAN


Continue to obtain serial cardiac enzymes to rule out an acute event. 


Check lipid panel. 


Hold aspirin as he has an allergy of rash and hives. 


Echocardiogram has been obtained and will be reviewed. 


NPO after midnight tonight for possible stress test vs coronary angiography 

depending on clinical course. 


Continue to monitor blood pressure and consider initiation of ACE or ARB 

depending on subsequent readings. 


Smoking cessation recommended. 


Further recommendations to follow. 


Thank you kindly for this consultation. 





Nurse Practitioner note has been reviewed, I agree with a documented findings 

and plan of care.  Patient was seen and examined.








Past Medical History


Past Medical History: COPD, Osteoarthritis (OA)


Additional Past Medical History / Comment(s): Hyperparathyroidism status post 

parathyroidectomy.


History of Any Multi-Drug Resistant Organisms: None Reported


Past Surgical History: No Surgical Hx Reported


Additional Past Surgical History / Comment(s): parathyroidectomy and sinus 

surgery.


Past Anesthesia/Blood Transfusion Reactions: No Reported Reaction


Past Psychological History: No Psychological Hx Reported


Smoking Status: Current every day smoker


Past Alcohol Use History: None Reported


Past Drug Use History: None Reported





- Past Family History


  ** Father


Family Medical History: No Reported History





  ** Mother


Family Medical History: No Reported History





  ** Brother(s)


Family Medical History: Unable to Obtain





  ** Sister(s)


Family Medical History: Unable to Obtain





  ** Son(s)


Family Medical History: No Reported History





Medications and Allergies


                                Home Medications











 Medication  Instructions  Recorded  Confirmed  Type


 


Albuterol Inhaler [Ventolin Hfa 2 puff INHALATION RT-QID PRN 01/26/19 09/17/19 

History





Inhaler]    


 


Albuterol Nebulized [Ventolin 2.5 mg INHALATION RT-Q6H PRN 09/17/19 09/17/19 

History





Nebulized]    








                                    Allergies











Allergy/AdvReac Type Severity Reaction Status Date / Time


 


aspirin Allergy  Rash/Hives Verified 09/17/19 10:14


 


ketchup Allergy  Unknown Verified 09/17/19 10:14














Physical Exam


Vitals: 


                                   Vital Signs











  Temp Pulse Resp BP Pulse Ox


 


 09/17/19 14:23   98  16  134/87  99


 


 09/17/19 14:14   68   


 


 09/17/19 14:03   71   


 


 09/17/19 10:47   76   


 


 09/17/19 10:19   68   


 


 09/17/19 09:59  98.0 F  80  18  175/81  97








                                Intake and Output











 09/16/19 09/17/19 09/17/19





 22:59 06:59 14:59


 


Other:   


 


  Weight   65.771 kg














Results





                                 09/17/19 10:25





                                 09/17/19 10:25


                                 Cardiac Enzymes











  09/17/19 09/17/19 Range/Units





  10:25 10:25 


 


AST  17   (17-59)  U/L


 


Troponin I   <0.012  (0.000-0.034)  ng/mL








                                   Coagulation











  09/17/19 Range/Units





  10:25 


 


PT  10.2  (9.0-12.0)  sec


 


APTT  24.8  (22.0-30.0)  sec








                                       CBC











  09/17/19 Range/Units





  10:25 


 


WBC  10.0  (3.8-10.6)  k/uL


 


RBC  5.30  (4.30-5.90)  m/uL


 


Hgb  16.5  (13.0-17.5)  gm/dL


 


Hct  48.6  (39.0-53.0)  %


 


Plt Count  271  (150-450)  k/uL








                          Comprehensive Metabolic Panel











  09/17/19 Range/Units





  10:25 


 


Sodium  138  (137-145)  mmol/L


 


Potassium  4.2  (3.5-5.1)  mmol/L


 


Chloride  100  ()  mmol/L


 


Carbon Dioxide  30  (22-30)  mmol/L


 


BUN  13  (9-20)  mg/dL


 


Creatinine  1.00  (0.66-1.25)  mg/dL


 


Glucose  142 H  (74-99)  mg/dL


 


Calcium  9.1  (8.4-10.2)  mg/dL


 


AST  17  (17-59)  U/L


 


ALT  17 L  (21-72)  U/L


 


Alkaline Phosphatase  66  ()  U/L


 


Total Protein  6.7  (6.3-8.2)  g/dL


 


Albumin  4.1  (3.5-5.0)  g/dL








                               Current Medications











Generic Name Dose Route Start Last Admin





  Trade Name Freq  PRN Reason Stop Dose Admin


 


Albuterol/Ipratropium  3 ml  09/17/19 12:00  09/17/19 14:02





  Duoneb 0.5 Mg-3 Mg/3 Ml Soln  INHALATION   3 ml





  RT-Q4H MIKI   Administration


 


Heparin Sodium/Sodium Chloride  250 mls @ 7.893 mls/hr  09/17/19 11:45  09/17/19

12:23





  25,000 unit/ Sodium Chloride  IV   12 units/kg/hr





  .Q24H MIKI   7.893 mls/hr





    Administration





  Protocol  





  12 UNITS/KG/HR  


 


Nitroglycerin  0.4 mg  09/17/19 11:37 





  Nitrostat  SUBLINGUAL  





  Q5M PRN  





  Chest Pain  








                                Intake and Output











 09/16/19 09/17/19 09/17/19





 22:59 06:59 14:59


 


Other:   


 


  Weight   65.771 kg








                                 Patient Weight











 09/18/19





 06:59


 


Weight 65.771 kg








                                        





                                 09/17/19 10:25 





                                 09/17/19 10:25

## 2019-09-17 NOTE — HP
HISTORY AND PHYSICAL



DATE OF SERVICE:

09/17/2019



CHIEF COMPLAINT:

Chest pain.



HISTORY OF PRESENT ILLNESS:

This 54-year-old gentleman with a past medical history of COPD, history of DJD,

hyperparathyroidism, not being followed by any primary care physician in the outpatient

setting, complaining of chest pain.  The patient reports chest pain as a tightness that

started yesterday, associated with shortness of breath.  The patient also has COPD

also.  The pain was not radiating anywhere else and the patient came to [facility] and admitted for further evaluation and treatment.  The pain was mostly

situated on the left side also.  The patient has seen Dr. MILADY Moss from the

pulmonary point of view.  There is no history of fever, rigors or chills.



PAST MEDICAL HISTORY:

COPD, DJD, history hyperparathyroidism, parathyroidectomy.



MEDICATIONS:

Home medications are:  Albuterol p.o. q.i.d. p.r.n.



ALLERGIES:

ASPIRIN, KETCHUP.



FAMILY HISTORY:

No history of heart disease or strokes in the family.



SOCIAL HISTORY:

History of smoking, continued ongoing.  No history of alcohol intake.



REVIEW OF SYSTEMS:

ENT: No diminished hearing.  No diminished vision.

CARDIOVASCULAR as mentioned earlier.

RESPIRATORY: As mentioned earlier.

GI no nausea or vomiting.

 no dysuria or hematuria.

Nervous system: No numbness or weakness.

ALLERGY/IMMUNOLOGY:  No asthma, hayfever.

MUSCULOSKELETAL as mentioned earlier.

HEMATOLOGY/ONCOLOGY: No history of anemia.

ENDOCRINE:  No history of diabetes or hypothyroidism.

CONSTITUTIONAL: As mentioned earlier.

DERMATOLOGY: Negative.

RHEUMATOLOGY: Negative.

PSYCHIATRY: As mentioned earlier.



PHYSICAL EXAMINATION:

Alert and oriented x3.  Pulse is 64.  Blood pressure 137/70, respiration 18,

temperature 98.1, pulse ox 97% on room air.

HEENT: Conjunctivae normal. Oral mucosa moist.

NECK is no jugular venous distention.  No carotid bruit. No lymph node enlargement.

Cardiovascular system:  S1, S2. No S3, no S4.

RESPIRATORY: Breath sounds diminished in the bases.  A few scattered rhonchi and

crackles.

ABDOMEN:  Soft, nontender.  No mass palpable.

LEGS:  No edema. No swelling.

NERVOUS SYSTEM: Higher functions as  mentioned earlier. Moves all four limbs. No focal

motor or sensory deficits.

LYMPHATICS: No lymph nodes palpable in the neck, axillae or groin.

SKIN:  No ulcer. No ivett. No bleeding.

JOINTS:  No active deforming arthropathy.



LABS:

CBC within normal limits. APTT 33.9, otherwise glucose 142.  EKG is noted.



ASSESSMENT:

1. Chest pain.  Possible unstable angina.

2. Chronic obstructive pulmonary disease.

3. History of degenerative joint disease.

4. Hyperparathyroidism status post parathyroidectomy.

5. History of nicotine dependence.



RECOMMENDATIONS AND DISCUSSION:

In this 54-year-old gentleman who presented with multiple medical issues, at this time,

I recommend continue the current medications, continue symptomatic treatment.  Continue

with rule out myocardial infarction.  Possible stress test in the morning.  Follow

closely with cardiology.  Guarded prognosis because of multiple complex medical issues.

Further recommendations to follow.





MMODL / IJN: [id number] / Job#: [id number]

## 2019-09-17 NOTE — XR
EXAMINATION TYPE: XR chest 2V

 

DATE OF EXAM: 9/17/2019

 

COMPARISON: 1/26/2019

 

HISTORY: 54-year-old male with chest pain

 

TECHNIQUE:  PA and lateral views

 

FINDINGS:  

Heart normal size. Aorta within normal limits. Mild central peribronchial cuffing and hyperinflation 
with flattening of the hemidiaphragms. No jaret consolidation or pleural effusion seen. Dextro convex
 scoliosis.

 

 

IMPRESSION:  

COPD and scoliosis. Chronic appearing parenchymal changes. No definite acute process.

## 2019-09-17 NOTE — ED
Chest Pain HPI





- General


Chief Complaint: Chest Pain


Stated Complaint: CHEST PAIN, ANDRES


Time Seen by Provider: 09/17/19 10:06


Source: patient, RN notes reviewed


Mode of arrival: wheelchair


Limitations: no limitations





- History of Present Illness


Initial Comments: 





This a 54-year-old male presents to emergency department with chief complaint of

chest pain, chest tightness.  Patient states that symptoms started yesterday he 

does admit that he feels short of breath and he has a history of COPD.  Patient 

states that his been doing breathing treatments at home which helped but states 

that symptoms acute worsening.  Patient denies any prior cardiac disease so he 

does not go to primary care physician a regular basis he denies history of 

hyperlipidemia or diabetes.  Patient is a 1 pack per day smoker states that he 

used to smoke 3 packs per day.  Patient denies any current nausea vomiting.  

Patient states pain is primarily on his left side of his chest.





- Related Data


                                Home Medications











 Medication  Instructions  Recorded  Confirmed


 


Albuterol Inhaler [Ventolin Hfa 2 puff INHALATION RT-QID PRN 01/26/19 09/17/19





Inhaler]   


 


Albuterol Nebulized [Ventolin 2.5 mg INHALATION RT-Q6H PRN 09/17/19 09/17/19





Nebulized]   











                                    Allergies











Allergy/AdvReac Type Severity Reaction Status Date / Time


 


aspirin Allergy  Rash/Hives Verified 09/17/19 10:14


 


ketchup Allergy  Unknown Verified 09/17/19 10:14














Review of Systems


ROS Statement: 


Those systems with pertinent positive or pertinent negative responses have been 

documented in the HPI.





ROS Other: All systems not noted in ROS Statement are negative.





EKG Findings





- EKG Comments:


EKG Findings:: EKG performed at 10:24 normal sinus rhythm rate of 61.  134 QRS 

90 QT/QTc 408/410





Past Medical History


Past Medical History: COPD, Osteoarthritis (OA)


Additional Past Medical History / Comment(s): Hyperparathyroidism status post 

parathyroidectomy.


History of Any Multi-Drug Resistant Organisms: None Reported


Past Surgical History: No Surgical Hx Reported


Additional Past Surgical History / Comment(s): parathyroidectomy and sinus 

surgery.


Past Anesthesia/Blood Transfusion Reactions: No Reported Reaction


Past Psychological History: No Psychological Hx Reported


Smoking Status: Current every day smoker


Past Alcohol Use History: None Reported


Past Drug Use History: None Reported





- Past Family History


  ** Father


Family Medical History: No Reported History





  ** Mother


Family Medical History: No Reported History





  ** Brother(s)


Family Medical History: Unable to Obtain





  ** Sister(s)


Family Medical History: Unable to Obtain





  ** Son(s)


Family Medical History: No Reported History





General Exam


Limitations: no limitations


General appearance: alert, in no apparent distress


Head exam: Present: atraumatic, normocephalic, normal inspection


Eye exam: Present: normal appearance, PERRL, EOMI.  Absent: scleral icterus, 

conjunctival injection, periorbital swelling


ENT exam: Present: normal exam, normal oropharynx, mucous membranes moist


Neck exam: Present: normal inspection, full ROM.  Absent: tenderness, 

meningismus, lymphadenopathy


Respiratory exam: Present: wheezes.  Absent: normal lung sounds bilaterally, 

respiratory distress, rales, rhonchi, stridor


Cardiovascular Exam: Present: regular rate, normal rhythm, normal heart sounds. 

Absent: systolic murmur, diastolic murmur, rubs, gallop, clicks


GI/Abdominal exam: Present: soft, normal bowel sounds.  Absent: distended, 

tenderness, guarding, rebound, rigid


Neurological exam: Present: alert


Skin exam: Present: warm, dry, intact, normal color.  Absent: rash





Course


                                   Vital Signs











  09/17/19 09/17/19 09/17/19





  09:59 10:19 10:47


 


Temperature 98.0 F  


 


Pulse Rate 80 68 76


 


Respiratory 18  





Rate   


 


Blood Pressure 175/81  


 


O2 Sat by Pulse 97  





Oximetry   














Chest Pain MDM





- MDM





54-year-old male presented from chief complaint of chest pain.  Patient has an 

extensive smoker has a history of COPD labs marked were EKG does not reveal any 

acute changes at this time patient will be admitted for chest pain observation 

repeat troponin cardiology evaluation.





Disposition


Clinical Impression: 


 Chest pain, COPD (chronic obstructive pulmonary disease)





Disposition: ADMITTED AS IP TO THIS HOSP


Condition: Fair


Referrals: 


None,Stated [Primary Care Provider] - 1-2 days

## 2019-09-18 VITALS — SYSTOLIC BLOOD PRESSURE: 122 MMHG | HEART RATE: 91 BPM | DIASTOLIC BLOOD PRESSURE: 74 MMHG | TEMPERATURE: 97.6 F

## 2019-09-18 LAB
CHOLEST SERPL-MCNC: 190 MG/DL (ref ?–200)
HDLC SERPL-MCNC: 47 MG/DL (ref 40–60)
LDLC SERPL CALC-MCNC: 129 MG/DL (ref 0–99)
PLATELET # BLD AUTO: 249 K/UL (ref 150–450)
TRIGL SERPL-MCNC: 68 MG/DL (ref ?–150)

## 2019-09-18 RX ADMIN — IPRATROPIUM BROMIDE AND ALBUTEROL SULFATE SCH ML: .5; 3 SOLUTION RESPIRATORY (INHALATION) at 10:57

## 2019-09-18 RX ADMIN — IPRATROPIUM BROMIDE AND ALBUTEROL SULFATE SCH: .5; 3 SOLUTION RESPIRATORY (INHALATION) at 02:24

## 2019-09-18 RX ADMIN — IPRATROPIUM BROMIDE AND ALBUTEROL SULFATE SCH ML: .5; 3 SOLUTION RESPIRATORY (INHALATION) at 07:19

## 2019-09-18 RX ADMIN — IPRATROPIUM BROMIDE AND ALBUTEROL SULFATE SCH: .5; 3 SOLUTION RESPIRATORY (INHALATION) at 03:00

## 2019-09-18 RX ADMIN — HEPARIN SODIUM SCH MLS/HR: 10000 INJECTION, SOLUTION INTRAVENOUS at 05:39

## 2019-09-18 NOTE — P.PN
Subjective





This is a pleasant 54-year-old  male past medical history significant 

for COPD, hyperparathyroidism status post parathyroidectomy and chronic nicotine

dependence.  He denies prior history of coronary artery disease, hypertension, 

diabetes mellitus or dyslipidemia.  He does not see a cardiologist in the 

outpatient setting. Pt is seen and examined laying flat in bed in no acute 

distress. He continues to feel intermittent pleuritic chest pains. No associated

with exertion or activity. Mild chronic shortness of breath. Blood pressure 116

/69, heart rate 60 afebrile and maintaining oxygen saturation on room air. 

Laboratory data reviewed, cardiac enzymes negative x3, , HDL 47. 





GENERAL: This is a 54-year-old  male in no apparent distress at the 

time of my examination.


HEENT: Head is atraumatic, normocephalic. Pupils are equal, round. Sclerae 

anicteric. Conjunctivae are clear. Mucous membranes of the mouth are moist. Neck

is supple. There is no jugular venous distention. No carotid bruit is heard.


LUNGS: Expiratory wheeze, no rales or rhonchi. Diminished bilaterally. No chest 

wall tenderness is noted on palpation over the left precordial region. No pain 

with deep breathing.


HEART: Regular rate and rhythm without murmurs, rubs or gallops. S1 and S2 

heard.


EXTREMITIES: No evidence of peripheral edema and no calf tenderness noted.


 


ASSESSMENT


Chest pain, atypical. An acute coronary event has been ruled out. 


COPD


Hypertension


Chronic nicotine dependence


History of parathyroidectomy





PLAN


Pain is very atypical for angina, likely related to underlying COPD. 


Proceed with dobutamine stress test to assess for stress induced ischemia. 


Echocardiogram obtained and reviewed. 


Diet and exercise recommended for lowering of LDL cholesterol. 


If stress test is normal he may be discharged home. Pain likely related to 

underlying COPD. 





Nurse Practitioner note has been reviewed, I agree with a documented findings 

and plan of care.  Patient was seen and examined.





Objective





- Vital Signs


Vital signs: 


                                   Vital Signs











Temp  98.2 F   09/18/19 07:40


 


Pulse  60   09/18/19 07:40


 


Resp  18   09/18/19 07:40


 


BP  116/69   09/18/19 07:40


 


Pulse Ox  96   09/18/19 07:40








                                 Intake & Output











 09/17/19 09/18/19 09/18/19





 18:59 06:59 18:59


 


Intake Total 50.778 354.798 


 


Balance 50.778 354.798 


 


Weight 65.771 kg  


 


Intake:   


 


  Intake, IV Titration 50.778 132.798 





  Amount   


 


    Heparin Sod,Pork in 0.45% 50.778 132.798 





    NaCl 25,000 unit In 0.45   





    % NaCl 1 250ml.bag @ 12   





    UNITS/KG/HR 7.893 mls/hr   





    IV .Q24H MIKI Rx#:   





    [id number]   


 


  Oral  222 


 


Other:   


 


  Voiding Method Toilet Toilet Toilet


 


  # Voids 1  














- Labs


CBC & Chem 7: 


                                 09/18/19 07:54





                                 09/17/19 10:25


Labs: 


                  Abnormal Lab Results - Last 24 Hours (Table)











  09/17/19 09/18/19 09/18/19 Range/Units





  17:52 00:59 07:54 


 


APTT  33.9 H  44.8 H   (22.0-30.0)  sec


 


LDL Cholesterol, Calc    129 H  (0-99)  mg/dL














  09/18/19 Range/Units





  07:54 


 


APTT  62.5 H  (22.0-30.0)  sec


 


LDL Cholesterol, Calc   (0-99)  mg/dL

## 2019-09-18 NOTE — ECHOS
STRESS ECHOCARDIOGRAM



DATE OF SERVICE:

09/18/2019



INDICATIONS:

Chest pain.



MEDICATIONS:

Albuterol.



BASELINE HEART RATE:

63



BASELINE BLOOD PRESSURE:

106/50



MAXIMUM HEART RATE:

149



MAXIMUM BLOOD PRESSURE:

189/70



85% MPHR:

139



100% MPHR:

164



METS:



MAXIMUM STAGE REACHED:



TOTAL EXERCISE TIME:



CLINICAL INFORMATION:

Baseline rhythm is sinus mechanism, rate 63, normal axis, intervals, rare PVCs.

Baseline blood pressure 106/50 mmHg. Patient received infusion of dobutamine per

protocol reaching a peak rate of 149 beats per minute which is equal to 91% maximum

predicted heart rate.  Peak blood pressure 189/70 mmHg. Electrocardiograph monitoring

revealed rare PACs and PVCs.  There was no evidence of diagnostic ischemic ST

deviation.



Baseline echocardiogram revealed normal wall thickening and motion. At peak exercise,

there was normal wall augmentation and thickening.



CONCLUSION:

1. Normal electrocardiograph response to dobutamine infusion.

2. Normal stress echocardiogram with no evidence of stress induced ischemia.





MMODL / IJN: [id number] / Job#: [id number]

## 2019-09-18 NOTE — ECHOF
Referral Reason:chest pain



MEASUREMENTS

--------

HEIGHT: 170.2 cm

WEIGHT: 66.2 kg

BP: 

IVSd:   1.2 cm     (0.6 - 1.1)

LVIDd:   4.6 cm     (3.9 - 5.3)

LVPWd:   1.0 cm     (0.6 - 1.1)

IVSs:   1.1 cm

LVIDs:   3.5 cm

LVPWs:   1.3 cm

LA Diam:   3.5 cm     (2.7 - 3.8)

Ao Diam:   3.5 cm     (2.0 - 3.7)

AV Cusp:   2.2 cm     (1.5 - 2.6)

LA Diam:   3.4 cm     (2.7 - 3.8)

MV EXCURSION:   27.766 mm     (> 18.000)

MV EF SLOPE:   147 mm/s     (70 - 150)

EPSS:   0.2 cm

MV E Danie:   0.76 m/s

MV DecT:   205 ms

MV A Danie:   0.57 m/s

MV E/A Ratio:   1.32 

RAP:   5.00 mmHg

RVSP:   7.37 mmHg

TAPSE:   3.10 cm







FINDINGS

--------

Sinus rhythm.

This was a technically good study.

LV size, wall thickness and systolic function are normal, with an EF greater than 55%.   The left david
tricular size is normal.

The right ventricle is normal in size.

The left atrial size is normal.

The right atrial size is normal.

The aortic valve is trileaflet, and appears structurally normal. No aortic stenosis or regurgitation.


The mitral valve is normal.   Mild mitral regurgitation is present.

Mild tricuspid regurgitation present.   There is no evidence of pulmonary hypertension.   The right v
entricular systolic pressure, as measured by Doppler, is 7.37mmHg.

There is no pulmonic regurgitation present.

The aortic root size is normal.

There is no pericardial effusion.



CONCLUSIONS

--------

1. Sinus rhythm.

2. This was a technically good study.

3. LV size, wall thickness and systolic function are normal, with an EF greater than 55%.

4. The left ventricular size is normal.

5. The left atrial size is normal.

6. The aortic valve is trileaflet, and appears structurally normal. No aortic stenosis or regurgitati
on.

7. Mild mitral regurgitation is present.

8. Mild tricuspid regurgitation present.

9. There is no evidence of pulmonary hypertension.

10. There is no pulmonic regurgitation present.

11. The aortic root size is normal.

12. There is no pericardial effusion.





SONOGRAPHER: [place] RDCS

## 2020-02-26 ENCOUNTER — HOSPITAL ENCOUNTER (INPATIENT)
Dept: HOSPITAL 47 - EC | Age: 55
LOS: 2 days | Discharge: HOME | DRG: 881 | End: 2020-02-28
Payer: COMMERCIAL

## 2020-02-26 DIAGNOSIS — F17.210: ICD-10-CM

## 2020-02-26 DIAGNOSIS — F51.04: ICD-10-CM

## 2020-02-26 DIAGNOSIS — F10.21: ICD-10-CM

## 2020-02-26 DIAGNOSIS — E89.0: ICD-10-CM

## 2020-02-26 DIAGNOSIS — F32.9: Primary | ICD-10-CM

## 2020-02-26 DIAGNOSIS — J44.9: ICD-10-CM

## 2020-02-26 DIAGNOSIS — Z88.6: ICD-10-CM

## 2020-02-26 DIAGNOSIS — Z71.6: ICD-10-CM

## 2020-02-26 DIAGNOSIS — M19.90: ICD-10-CM

## 2020-02-26 DIAGNOSIS — Z91.018: ICD-10-CM

## 2020-02-26 DIAGNOSIS — F43.0: ICD-10-CM

## 2020-02-26 DIAGNOSIS — Z79.899: ICD-10-CM

## 2020-02-26 DIAGNOSIS — R45.851: ICD-10-CM

## 2020-02-26 PROCEDURE — 84443 ASSAY THYROID STIM HORMONE: CPT

## 2020-02-26 PROCEDURE — 80306 DRUG TEST PRSMV INSTRMNT: CPT

## 2020-02-26 PROCEDURE — 83036 HEMOGLOBIN GLYCOSYLATED A1C: CPT

## 2020-02-26 PROCEDURE — 80053 COMPREHEN METABOLIC PANEL: CPT

## 2020-02-26 PROCEDURE — 99284 EMERGENCY DEPT VISIT MOD MDM: CPT

## 2020-02-26 PROCEDURE — 85025 COMPLETE CBC W/AUTO DIFF WBC: CPT

## 2020-02-26 PROCEDURE — 80061 LIPID PANEL: CPT

## 2020-02-26 PROCEDURE — 82075 ASSAY OF BREATH ETHANOL: CPT

## 2020-02-26 RX ADMIN — FLUTICASONE PROPIONATE SCH PUFF: 110 AEROSOL, METERED RESPIRATORY (INHALATION) at 22:23

## 2020-02-26 RX ADMIN — NICOTINE SCH PATCH: 21 PATCH, EXTENDED RELEASE TRANSDERMAL at 16:17

## 2020-02-26 RX ADMIN — MONTELUKAST SODIUM SCH MG: 10 TABLET, FILM COATED ORAL at 21:28

## 2020-02-26 NOTE — ED
Psych HPI





- General


Chief Complaint: Psychiatric Symptoms


Stated Complaint: Mental health eval


Time Seen by Provider: 02/26/20 12:04


Source: patient, RN notes reviewed


Mode of arrival: ambulatory


Limitations: no limitations





- History of Present Illness


Initial Comments: 


55-year-old male presents emergency Department chief complaint of depression, 

suicidal ideation.  Patient states that he's been depressed for a while states 

that he's been having thoughts of hurting himself.  Patient has attempted to 

hurt himself at this time.  Denies any drug or alcohol abuse has been on any 

antidepressants.  Patient states even appointment set up tomorrow with his 

doctor but states that he felt worse today so present emergency department.








- Related Data


                                Home Medications











 Medication  Instructions  Recorded  Confirmed


 


Albuterol Inhaler [Ventolin Hfa 2 puff INHALATION RT-QID PRN 01/26/19 09/17/19





Inhaler]   


 


Albuterol Nebulized [Ventolin 2.5 mg INHALATION RT-Q6H PRN 09/17/19 09/17/19





Nebulized]   











                                    Allergies











Allergy/AdvReac Type Severity Reaction Status Date / Time


 


aspirin Allergy  Rash/Hives Verified 02/26/20 12:03


 


ketchup Allergy  Unknown Verified 02/26/20 12:03














Review of Systems


ROS Statement: 


Those systems with pertinent positive or pertinent negative responses have been 

documented in the HPI.





ROS Other: All systems not noted in ROS Statement are negative.





Past Medical History


Past Medical History: COPD, Osteoarthritis (OA), Thyroid Disorder


Additional Past Medical History / Comment(s): Hyperparathyroidism status post 

parathyroidectomy.


History of Any Multi-Drug Resistant Organisms: None Reported


Past Surgical History: No Surgical Hx Reported


Additional Past Surgical History / Comment(s): parathyroidectomy and sinus 

surgery.


Past Anesthesia/Blood Transfusion Reactions: No Reported Reaction


Past Psychological History: Depression


Smoking Status: Current every day smoker


Past Alcohol Use History: Rare


Past Drug Use History: None Reported





- Past Family History


  ** Father


Family Medical History: No Reported History





  ** Mother


Family Medical History: No Reported History





  ** Brother(s)


Family Medical History: Unable to Obtain





  ** Sister(s)


Family Medical History: Unable to Obtain





  ** Son(s)


Family Medical History: No Reported History





General Exam


Limitations: no limitations


General appearance: alert, in no apparent distress


Head exam: Present: atraumatic, normocephalic, normal inspection


Eye exam: Present: normal appearance, PERRL, EOMI.  Absent: scleral icterus, 

conjunctival injection, periorbital swelling


ENT exam: Present: normal exam, normal oropharynx, mucous membranes moist, TM's 

normal bilaterally


Neck exam: Present: normal inspection, full ROM.  Absent: tenderness, 

meningismus, lymphadenopathy


Respiratory exam: Present: normal lung sounds bilaterally.  Absent: respiratory 

distress, wheezes, rales, rhonchi, stridor


Cardiovascular Exam: Present: regular rate, normal rhythm, normal heart sounds. 

Absent: systolic murmur, diastolic murmur, rubs, gallop, clicks


Neurological exam: Present: alert, oriented X3


Psychiatric exam: Present: depressed, flat affect





Course


                                   Vital Signs











  02/26/20





  11:59


 


Temperature 97.5 F L


 


Pulse Rate 84


 


Respiratory 20





Rate 


 


Blood Pressure 151/77


 


O2 Sat by Pulse 98





Oximetry 














Medical Decision Making





- Medical Decision Making





Patient evaluated by EPS and psychiatrists who recommends patient to be admitted





Disposition


Clinical Impression: 


 Depression, Suicidal ideation





Disposition: TRANSFER TO PSYCH HOSP/UNIT


Referrals: 


Marifer Day DO [Primary Care Provider] - 1-2 days

## 2020-02-27 VITALS — HEART RATE: 65 BPM | RESPIRATION RATE: 16 BRPM

## 2020-02-27 LAB
ALBUMIN SERPL-MCNC: 3.7 G/DL (ref 3.5–5)
ALP SERPL-CCNC: 68 U/L (ref 38–126)
ALT SERPL-CCNC: 14 U/L (ref 4–49)
ANION GAP SERPL CALC-SCNC: 5 MMOL/L
AST SERPL-CCNC: 21 U/L (ref 17–59)
BASOPHILS # BLD AUTO: 0.1 K/UL (ref 0–0.2)
BASOPHILS NFR BLD AUTO: 1 %
BUN SERPL-SCNC: 9 MG/DL (ref 9–20)
CALCIUM SPEC-MCNC: 8.9 MG/DL (ref 8.4–10.2)
CHLORIDE SERPL-SCNC: 104 MMOL/L (ref 98–107)
CHOLEST SERPL-MCNC: 189 MG/DL (ref ?–200)
CO2 SERPL-SCNC: 27 MMOL/L (ref 22–30)
EOSINOPHIL # BLD AUTO: 0.3 K/UL (ref 0–0.7)
EOSINOPHIL NFR BLD AUTO: 3 %
ERYTHROCYTE [DISTWIDTH] IN BLOOD BY AUTOMATED COUNT: 5.14 M/UL (ref 4.3–5.9)
ERYTHROCYTE [DISTWIDTH] IN BLOOD: 12.3 % (ref 11.5–15.5)
GLUCOSE SERPL-MCNC: 88 MG/DL (ref 74–99)
HBA1C MFR BLD: 5.3 % (ref 4–6)
HCT VFR BLD AUTO: 48.3 % (ref 39–53)
HDLC SERPL-MCNC: 38 MG/DL (ref 40–60)
HGB BLD-MCNC: 15.9 GM/DL (ref 13–17.5)
LDLC SERPL CALC-MCNC: 128 MG/DL (ref 0–99)
LYMPHOCYTES # SPEC AUTO: 2.3 K/UL (ref 1–4.8)
LYMPHOCYTES NFR SPEC AUTO: 24 %
MCH RBC QN AUTO: 30.9 PG (ref 25–35)
MCHC RBC AUTO-ENTMCNC: 32.9 G/DL (ref 31–37)
MCV RBC AUTO: 93.9 FL (ref 80–100)
MONOCYTES # BLD AUTO: 0.7 K/UL (ref 0–1)
MONOCYTES NFR BLD AUTO: 7 %
NEUTROPHILS # BLD AUTO: 6.1 K/UL (ref 1.3–7.7)
NEUTROPHILS NFR BLD AUTO: 64 %
PLATELET # BLD AUTO: 264 K/UL (ref 150–450)
POTASSIUM SERPL-SCNC: 4 MMOL/L (ref 3.5–5.1)
PROT SERPL-MCNC: 6.4 G/DL (ref 6.3–8.2)
SODIUM SERPL-SCNC: 136 MMOL/L (ref 137–145)
TRIGL SERPL-MCNC: 116 MG/DL (ref ?–150)
WBC # BLD AUTO: 9.6 K/UL (ref 3.8–10.6)

## 2020-02-27 RX ADMIN — FLUTICASONE PROPIONATE SCH PUFF: 110 AEROSOL, METERED RESPIRATORY (INHALATION) at 21:41

## 2020-02-27 RX ADMIN — MONTELUKAST SODIUM SCH MG: 10 TABLET, FILM COATED ORAL at 21:41

## 2020-02-27 RX ADMIN — NICOTINE SCH PATCH: 21 PATCH, EXTENDED RELEASE TRANSDERMAL at 09:16

## 2020-02-27 RX ADMIN — FLUTICASONE PROPIONATE SCH PUFF: 110 AEROSOL, METERED RESPIRATORY (INHALATION) at 09:16
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
## 2020-02-28 VITALS — SYSTOLIC BLOOD PRESSURE: 130 MMHG | TEMPERATURE: 98.5 F | DIASTOLIC BLOOD PRESSURE: 72 MMHG

## 2020-02-28 RX ADMIN — FLUTICASONE PROPIONATE SCH PUFF: 110 AEROSOL, METERED RESPIRATORY (INHALATION) at 10:47

## 2020-02-28 RX ADMIN — NICOTINE SCH PATCH: 21 PATCH, EXTENDED RELEASE TRANSDERMAL at 10:48
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
## 2020-02-28 NOTE — P.CONS
History of Present Illness





- Reason for Consult


Consult date: 02/27/20


medical management





- Chief Complaint


suicidal ideation





- History of Present Illness





Jose Choi is a 54 yo M with PMH of COPD, no previous history of depression who

presented to [facility] with feelings of hopelessness, worthlessness, and suicidal 

ideation. He states his girlfriend has been contacting her previous boyfriend 

and pt is concerned she is having an affair. He states that this has made him 

suicidal. He denies any previous history of depression and has never been on 

psychiatric medication. He denies any physical symptoms today including chest 

pain, shortness of breath, fever, chills.





Review of Systems


All systems: negative


Constitutional: Denies chills, Denies fever


Eyes: denies blurred vision, denies pain


Ears, nose, mouth and throat: Denies headache, Denies sore throat


Cardiovascular: Denies chest pain, Denies shortness of breath


Respiratory: Denies cough


Gastrointestinal: Denies abdominal pain, Denies diarrhea, Denies nausea, Denies 

vomiting


Musculoskeletal: Denies myalgias


Integumentary: Denies pruritus, Denies rash


Neurological: Denies numbness, Denies weakness


Psychiatric: Denies anxiety, Denies depression


Endocrine: Denies fatigue, Denies weight change





Past Medical History


Past Medical History: COPD, Osteoarthritis (OA), Thyroid Disorder


Additional Past Medical History / Comment(s): Hyperparathyroidism status post 

parathyroidectomy.


History of Any Multi-Drug Resistant Organisms: None Reported


Past Surgical History: No Surgical Hx Reported


Additional Past Surgical History / Comment(s): parathyroidectomy and sinus 

surgery.


Past Anesthesia/Blood Transfusion Reactions: No Reported Reaction


Past Psychological History: Depression


Smoking Status: Current every day smoker


Past Alcohol Use History: Rare


Additional Past Alcohol Use History / Comment(s): started smoking at age 23(198

8) smokes 1 ppd


Past Drug Use History: None Reported





- Past Family History


  ** Father


Family Medical History: No Reported History





  ** Mother


Family Medical History: No Reported History





  ** Brother(s)


Family Medical History: Unable to Obtain





  ** Sister(s)


Family Medical History: Unable to Obtain





  ** Son(s)


Family Medical History: No Reported History





Medications and Allergies


                                Home Medications











 Medication  Instructions  Recorded  Confirmed  Type


 


Albuterol Sulfate [Proair Hfa] 2 puff INHALATION RT-QID PRN 02/26/20 02/26/20 

History


 


Beclomethasone Dip 80 Mcg/Puff 2 puff INHALATION RT-BID 02/26/20 02/26/20 

History





[Qvar 80 mcg]    


 


Ipratropium-Albuterol Nebulize 3 ml INHALATION RT-Q6H PRN 02/26/20 02/26/20 

History





[Duoneb 0.5 mg-3 mg/3 ml Soln]    


 


Montelukast [Singulair] 10 mg PO HS 02/26/20 02/26/20 History


 


Mirtazapine [Remeron] 15 mg PO HS #30 tab 02/28/20  Rx








                                    Allergies











Allergy/AdvReac Type Severity Reaction Status Date / Time


 


aspirin Allergy  Rash/Hives Verified 02/26/20 17:10


 


ketchup Allergy  Unknown Verified 02/26/20 17:10














Physical Exam


Vitals: 


                                   Vital Signs











  Temp Pulse Resp BP


 


 02/28/20 06:54  98.5 F  65  16  130/72














General: well nourished, well developed, NAD. Vitals reviewed


Eyes: PERRL, EOMI, conjunctiva normal


HENT: normocephalic, mucus membranes moist


Neck: supple, no JVD


Lungs: normal respiratory effort, no rales. Minimal wheezing


CV: Regular rate and rhythm, no murmur. Peripheral pulses 2+


Abdomen: soft, nondistended, no organomegaly


Lymph: no cervical or axillary LAD


Skin: warm and dry.


Neuro: A&Ox3, depressed mood, normal affect





Results


CBC & Chem 7: 


                                 02/27/20 07:39





                                 02/27/20 07:39





Assessment and Plan


(1) COPD (chronic obstructive pulmonary disease)


Status: Acute   Code(s): J44.9 - CHRONIC OBSTRUCTIVE PULMONARY DISEASE, 

UNSPECIFIED   SNOMED Code(s): 77350938


   





(2) Alcohol use disorder, severe, in sustained remission


Status: Chronic   Priority: Low   Code(s): F10.21 - ALCOHOL DEPENDENCE, IN REM

ISSION   SNOMED Code(s): 19381664


   





(3) Partner relationship problem


Status: Chronic   Priority: Medium   Code(s): Z63.0 - PROBLEMS IN RELATIONSHIP 

WITH SPOUSE OR PARTNER   SNOMED Code(s): 6036669273519


   





(4) Suicidal ideation


Status: Resolved   Priority: Low   Code(s): R45.851 - SUICIDAL IDEATIONS   

SNOMED Code(s): 2205129


   


Plan: 





1. Suicidal ideation. Acute stress reaction. Management per psychiatry


2. COPD. continue singulair, inhaler


3. Tobacco use disorder. Nicotine patch

## 2020-10-25 ENCOUNTER — HOSPITAL ENCOUNTER (EMERGENCY)
Dept: HOSPITAL 47 - EC | Age: 55
Discharge: HOME | End: 2020-10-25
Payer: MEDICAID

## 2020-10-25 VITALS
HEART RATE: 83 BPM | TEMPERATURE: 98 F | DIASTOLIC BLOOD PRESSURE: 78 MMHG | SYSTOLIC BLOOD PRESSURE: 137 MMHG | RESPIRATION RATE: 18 BRPM

## 2020-10-25 DIAGNOSIS — Z23: ICD-10-CM
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
## 2020-10-25 DIAGNOSIS — S61.401A: ICD-10-CM

## 2020-10-25 DIAGNOSIS — Z88.6: ICD-10-CM
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
## 2020-10-25 DIAGNOSIS — Z79.51: ICD-10-CM

## 2020-10-25 DIAGNOSIS — L03.113: Primary | ICD-10-CM

## 2020-10-25 DIAGNOSIS — Z91.018: ICD-10-CM
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
## 2020-10-25 DIAGNOSIS — F17.200: ICD-10-CM

## 2020-10-25 DIAGNOSIS — J44.9: ICD-10-CM

## 2020-10-25 PROCEDURE — 99283 EMERGENCY DEPT VISIT LOW MDM: CPT

## 2020-10-25 PROCEDURE — 90471 IMMUNIZATION ADMIN: CPT

## 2020-10-25 PROCEDURE — 90715 TDAP VACCINE 7 YRS/> IM: CPT

## 2020-10-25 NOTE — XR
EXAMINATION TYPE: XR hand complete RT

 

DATE OF EXAM: 10/25/2020

 

COMPARISON: NONE

 

HISTORY: Pain

 

TECHNIQUE: 3 views

 

FINDINGS: Metacarpals are intact. There is deformity of the fifth metacarpal consistent with old heal
ed fracture. I see no acute fracture. There is no evidence of radiopaque foreign body.

 

IMPRESSION: No acute bony abnormality. No evidence of a foreign body.

## 2020-10-25 NOTE — ED
Skin/Abscess/FB HPI





- General


Chief complaint: Skin/Abscess/Foreign Body


Stated complaint: IHS splinter in hand


Time Seen by Provider: 10/25/20 22:31


Source: patient


Mode of arrival: ambulatory


Limitations: no limitations





- History of Present Illness


Initial comments: 


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

got a splinter between his third and fourth fingers 2 days ago.  He attempted to

use a needle to get it out but was unable to remove any foreign materials.  He 

has subsequently developed some redness and swelling in the area was concerned 

that he may need antibiotics.








- Related Data


                                Home Medications











 Medication  Instructions  Recorded  Confirmed


 


Albuterol Sulfate [Proair Hfa] 2 puff INHALATION RT-QID PRN 02/26/20 02/26/20


 


Beclomethasone Dip 80 Mcg/Puff 2 puff INHALATION RT-BID 02/26/20 02/26/20





[Qvar 80 mcg]   


 


Ipratropium-Albuterol Nebulize 3 ml INHALATION RT-Q6H PRN 02/26/20 02/26/20





[Duoneb 0.5 mg-3 mg/3 ml Soln]   


 


Montelukast [Singulair] 10 mg PO HS 02/26/20 02/26/20








                                  Previous Rx's











 Medication  Instructions  Recorded


 


Mirtazapine [Remeron] 15 mg PO HS #30 tab 02/28/20


 


Cephalexin [Keflex] 500 mg PO Q6HR 7 Days #28 cap 10/25/20











                                    Allergies











Allergy/AdvReac Type Severity Reaction Status Date / Time


 


aspirin Allergy  Rash/Hives Verified 10/25/20 22:23


 


ketchup Allergy  Unknown Verified 10/25/20 22:23














Review of Systems


ROS Statement: 


Those systems with pertinent positive or pertinent negative responses have been 

documented in the HPI.





ROS Other: All systems not noted in ROS Statement are negative.





Past Medical History


Past Medical History: COPD, Osteoarthritis (OA), Thyroid Disorder


Additional Past Medical History / Comment(s): Hyperparathyroidism status post 

parathyroidectomy.


History of Any Multi-Drug Resistant Organisms: None Reported


Past Surgical History: No Surgical Hx Reported


Additional Past Surgical History / Comment(s): parathyroidectomy and sinus 

surgery.


Past Anesthesia/Blood Transfusion Reactions: No Reported Reaction


Past Psychological History: Depression


Smoking Status: Current every day smoker


Past Alcohol Use History: Rare


Past Drug Use History: None Reported





- Past Family History


  ** Father


Family Medical History: No Reported History





  ** Mother


Family Medical History: No Reported History





  ** Brother(s)


Family Medical History: Unable to Obtain





  ** Sister(s)


Family Medical History: Unable to Obtain





  ** Son(s)


Family Medical History: No Reported History





General Exam





- General Exam Comments


Initial Comments: 


Physical Exam


GENERAL:


Patient is well-developed and well-nourished.  


Patient is nontoxic and well-hydrated and is in no distress.





HENT:


Normocephalic, Atraumatic. 





EYES:


PERRL, EOMI





PULMONARY:


Unlabored respirations.  





CARDIOVASCULAR:


RRR


Warm and well perfused extremities





ABDOMEN:


Non-distended





SKIN:


Small open wound between third and fourth digit, no large abscess, no obvious 

foreign body. 


Skin color changes consistent with tobacco abuse





: 


Deferred





NEUROLOGIC:


Alert and oriented


Normal speech


Normal gait





MUSCULOSKELETAL:


Moving all extremities with no apparent injury 





PSYCHIATRIC:


No SI/HI





Limitations: no limitations





Course


                                   Vital Signs











  10/25/20





  22:21


 


Temperature 98.0 F


 


Pulse Rate 83


 


Respiratory 18





Rate 


 


Blood Pressure 137/78


 


O2 Sat by Pulse 99





Oximetry 














Medical Decision Making





- Medical Decision Making


Patient was seen and evaluated, history is obtained from the patient


Patient concerned he may have a sliver of metal in his finger x-rays were 

obtained and no foreign objects identifiable


Tetanus was updated


Patient does have a very small scab in the area where he attempted to open the 

area with a needle, there are some surrounding erythema he will be placed on 

Keflex, advised to keep the area clean and dry


First dose of Keflex given in the ER





Patient will be discharged home with prescription for Keflex return parameters 

were discussed patient discharged in stable condition.





Disposition


Clinical Impression: 


 Cellulitis of right hand





Disposition: HOME SELF-CARE


Condition: Stable


Additional Instructions: 


Take antibiotics, keep the hand clean and dry, you can soak hte area in warm 

soapy water 3x daily





Return to the ER for any acute worsening


Prescriptions: 


Cephalexin [Keflex] 500 mg PO Q6HR 7 Days #28 cap


Is patient prescribed a controlled substance at d/c from ED?: No


Referrals: 


Dexter Rodriguez MD [Primary Care Provider] - 1-2 days

## 2021-03-15 ENCOUNTER — HOSPITAL ENCOUNTER (EMERGENCY)
Dept: HOSPITAL 47 - EC | Age: 56
Discharge: HOME | End: 2021-03-15
Payer: COMMERCIAL

## 2021-03-15 VITALS
SYSTOLIC BLOOD PRESSURE: 131 MMHG | DIASTOLIC BLOOD PRESSURE: 83 MMHG | TEMPERATURE: 97.7 F | HEART RATE: 77 BPM | RESPIRATION RATE: 18 BRPM

## 2021-03-15 DIAGNOSIS — J44.9: ICD-10-CM

## 2021-03-15 DIAGNOSIS — Z79.51: ICD-10-CM

## 2021-03-15 DIAGNOSIS — M19.90: ICD-10-CM

## 2021-03-15 DIAGNOSIS — S05.02XA: Primary | ICD-10-CM

## 2021-03-15 DIAGNOSIS — F17.200: ICD-10-CM
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
## 2021-03-15 PROCEDURE — 99283 EMERGENCY DEPT VISIT LOW MDM: CPT

## 2021-03-15 NOTE — ED
Eye Problem HPI





- General


Chief complaint: Eye Problems


Stated complaint: Eye pain, headache


Time Seen by Provider: 03/15/21 03:29


Source: patient


Mode of arrival: ambulatory


Limitations: no limitations





- History of Present Illness


MD chief complaint: eye pain


Onset/Timin


-: days(s)


Onset Description: sudden


Location: left eye


Place: work


If Injury: none


Eye Symptoms: burning, foreign body sensation


Severity: mild


Consistency: constant


Associated Symptoms: none


Treatments Prior to Arrival: none





- Related Data


Patient Tetanus UTD: Yes


                                Home Medications











 Medication  Instructions  Recorded  Confirmed


 


Albuterol Sulfate [Proair Hfa] 2 puff INHALATION RT-QID PRN 20


 


Beclomethasone Dip 80 Mcg/Puff 2 puff INHALATION RT-BID 20





[Qvar 80 mcg]   


 


Ipratropium-Albuterol Nebulize 3 ml INHALATION RT-Q6H PRN 20





[Duoneb 0.5 mg-3 mg/3 ml Soln]   


 


Montelukast [Singulair] 10 mg PO HS 20








                                  Previous Rx's











 Medication  Instructions  Recorded


 


Mirtazapine [Remeron] 15 mg PO HS #30 tab 20


 


Cephalexin [Keflex] 500 mg PO Q6HR 7 Days #28 cap 10/25/20











                                    Allergies











Allergy/AdvReac Type Severity Reaction Status Date / Time


 


aspirin Allergy  Rash/Hives Verified 03/15/21 02:10


 


ketchup Allergy  Unknown Verified 03/15/21 02:10














Review of Systems


ROS Statement: 


Those systems with pertinent positive or pertinent negative responses have been 

documented in the HPI.





ROS Other: All systems not noted in ROS Statement are negative.


Constitutional: Denies: fever


Eyes: Reports: eye pain.  Denies: eye discharge, vision change


ENT: Denies: congestion


Respiratory: Denies: cough, dyspnea


Skin: Denies: rash


Neurological: Denies: headache, weakness, numbness, paresthesias





Past Medical History


Past Medical History: COPD, Osteoarthritis (OA), Thyroid Disorder


Additional Past Medical History / Comment(s): Hyperparathyroidism status post p

arathyroidectomy.


History of Any Multi-Drug Resistant Organisms: None Reported


Past Surgical History: No Surgical Hx Reported


Additional Past Surgical History / Comment(s): parathyroidectomy and sinus 

surgery.


Past Anesthesia/Blood Transfusion Reactions: No Reported Reaction


Past Psychological History: Depression


Smoking Status: Current every day smoker


Past Alcohol Use History: Rare


Past Drug Use History: None Reported





- Past Family History


  ** Father


Family Medical History: No Reported History





  ** Mother


Family Medical History: No Reported History





  ** Brother(s)


Family Medical History: Unable to Obtain





  ** Sister(s)


Family Medical History: Unable to Obtain





  ** Son(s)


Family Medical History: No Reported History





General Exam


Limitations: no limitations


General appearance: alert, in no apparent distress


Head exam: Present: atraumatic, normocephalic


Eye exam: Present: PERRL, EOMI.  Absent: scleral icterus, conjunctival 

injection, nystagmus


ENT exam: Present: normal oropharynx


Respiratory exam: Present: normal lung sounds bilaterally


Cardiovascular Exam: Present: regular rate, normal rhythm, normal heart sounds. 

 Absent: systolic murmur, diastolic murmur, rubs, gallop


Skin exam: Present: warm, dry, intact, normal color.  Absent: rash





Course


                                   Vital Signs











  03/15/21





  02:07


 


Temperature 97.7 F


 


Pulse Rate 77


 


Respiratory 18





Rate 


 


Blood Pressure 131/83


 


O2 Sat by Pulse 96





Oximetry 














Medical Decision Making





- Medical Decision Making





Intraocular pressure checked with the [employer] device and is found to be normal at 

15.  There is no injection, corneal clouding or other evidence of elevated 

intraocular pressure





Fluorescein drop instilled and there is abrasion at the approximately 4:30 

position of the cornea.  Patient did have resolution of symptoms with the 

topical anesthetic.





We discussed appropriate further care and follow-up.





Disposition


Clinical Impression: 


 Corneal abrasion





Disposition: HOME SELF-CARE


Condition: Good


Instructions (If sedation given, give patient instructions):  Corneal Abrasion 

(ED)


Is patient prescribed a controlled substance at d/c from ED?: No


Referrals: 


Marifer Day DO [Primary Care Provider] - 1-2 days


Guy Amado MD [STAFF PHYSICIAN] - 1-2 days

## 2021-04-19 ENCOUNTER — HOSPITAL ENCOUNTER (EMERGENCY)
Dept: HOSPITAL 47 - EC | Age: 56
Discharge: HOME | End: 2021-04-19
Payer: COMMERCIAL

## 2021-04-19 VITALS — HEART RATE: 73 BPM | DIASTOLIC BLOOD PRESSURE: 93 MMHG | SYSTOLIC BLOOD PRESSURE: 137 MMHG

## 2021-04-19 VITALS — TEMPERATURE: 97.6 F | RESPIRATION RATE: 18 BRPM
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
## 2021-04-19 DIAGNOSIS — J44.9: ICD-10-CM

## 2021-04-19 DIAGNOSIS — Z79.899: ICD-10-CM
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
## 2021-04-19 DIAGNOSIS — F17.200: ICD-10-CM

## 2021-04-19 DIAGNOSIS — M19.90: ICD-10-CM

## 2021-04-19 DIAGNOSIS — S16.1XXA: Primary | ICD-10-CM

## 2021-04-19 DIAGNOSIS — F32.9: ICD-10-CM

## 2021-04-19 PROCEDURE — 70450 CT HEAD/BRAIN W/O DYE: CPT

## 2021-04-19 PROCEDURE — 71046 X-RAY EXAM CHEST 2 VIEWS: CPT

## 2021-04-19 PROCEDURE — 96372 THER/PROPH/DIAG INJ SC/IM: CPT

## 2021-04-19 PROCEDURE — 72125 CT NECK SPINE W/O DYE: CPT

## 2021-04-19 PROCEDURE — 73562 X-RAY EXAM OF KNEE 3: CPT

## 2021-04-19 PROCEDURE — 73030 X-RAY EXAM OF SHOULDER: CPT

## 2021-04-19 PROCEDURE — 99284 EMERGENCY DEPT VISIT MOD MDM: CPT
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
## 2021-05-29 ENCOUNTER — HOSPITAL ENCOUNTER (OUTPATIENT)
Dept: HOSPITAL 47 - RADMRIMAIN | Age: 56
Discharge: HOME | End: 2021-05-29
Attending: FAMILY MEDICINE
Payer: COMMERCIAL

## 2021-05-29 DIAGNOSIS — M48.02: ICD-10-CM

## 2021-05-29 DIAGNOSIS — M50.221: Primary | ICD-10-CM

## 2021-05-29 PROCEDURE — 72141 MRI NECK SPINE W/O DYE: CPT

## 2021-05-29 NOTE — MR
EXAMINATION TYPE: MR cervical spine wo con

 

DATE OF EXAM: 5/29/2021

 

COMPARISON: CT 4/19/2021

 

HISTORY: Acute neck pain, HA

 

TECHNIQUE: 

Multiplanar, multisequence images of the cervical spine were acquired.

 

C2-C3: No evidence for degenerative disc disease.  No disc bulge/herniation or protrusion.  No Canal 
stenosis.  Foramina are patent bilaterally.

 

C3-C4: There is bilateral foraminal encroachment due to uncovertebral joint hypertrophy and facet art
hropathy change. Left greater than right. Posterior extension endplate disc complex causes anterior m
ass effect on the thecal sac but only mild central stenosis.

 

C4-C5: Small central disc protrusion causes minimal anterior mass effect on the thecal sac. There is 
some mild right-sided foraminal encroachment.

 

C5-C6: Posterior extension endplate disc complex results in mild spinal stenosis causes anterior mass
 effect on the thecal sac. There is bilateral foraminal encroachment due to uncovertebral joint hyper
trophy.

 

C6-C7: Posterior extension endplate disc complex causes mild anterior mass effect on the thecal sac. 
There is foraminal encroachment greater on the left than on the right.

 

C7-T1: No evidence for degenerative disc disease.  No disc bulge/herniation or protrusion.  No Canal 
stenosis.  Foramina are patent bilaterally.

 

Cervical segments are intact.  There is normal alignment.  Cervical spinal cord is of normal signal. 
 Craniovertebral junction relationships are within normal limits.  Cervical vertebral bodies show pre
served height. There is multilevel spondylosis, endplate discogenic marrow signal change. Loss of dis
c height signal is greatest at C3-4, C5-6 and C6-7. Mucous retention cyst incidentally noted within t
he right maxillary sinus

 

IMPRESSION:

Multilevel degenerative disc disease, foraminal encroachment.

## 2021-08-04 NOTE — DS
DISCHARGE SUMMARY



FINAL DIAGNOSES:

1. Chest pain, possibly musculoskeletal, with a negative dobutamine stress echo.

2. Chronic obstructive pulmonary disease.

3. History of degenerative joint disease.

4. Hyperparathyroidism, status post parathyroidectomy.

5. History of nicotine dependence.



DISCHARGE DISPOSITION:

The patient will be discharged in stable condition with guarded prognosis.



HISTORY OF PRESENT ILLNESS:

This 54-year-old gentleman, not being followed by a primary physician in the outpatient

setting, was admitted with chest pain. Myocardial infarction was ruled out. Cardiology

performed a dobutamine stress echo which showed no evidence of any reversible ischemia.

Patient improved significantly.  Patient is discharged home in stable condition with

guarded prognosis.



On exam, vitals are stable.

CARDIOVASCULAR SYSTEM:  S1, S2 muffled.

ABDOMEN: Soft.

NERVOUS SYSTEM: No focal deficit.



DISCHARGE ADVICE AND MEDICATIONS:

1. Diet is cardiac.

2. Activity limited until followup.

3. Follow up with a primary physician in 1 to 2 days.

4. Follow up with Cardiology in 2 weeks.

5. Albuterol p.r.n.

Once again, the patient will be discharged in stable condition with guarded prognosis.





MMODL / IJN: [id number] / Job#: [id number] 1 or 2

## 2021-11-04 ENCOUNTER — HOSPITAL ENCOUNTER (OUTPATIENT)
Dept: HOSPITAL 47 - ORPAIN | Age: 56
Discharge: HOME | End: 2021-11-04
Attending: ANESTHESIOLOGY
Payer: COMMERCIAL

## 2021-11-04 VITALS — TEMPERATURE: 98.5 F

## 2021-11-04 VITALS — DIASTOLIC BLOOD PRESSURE: 76 MMHG | HEART RATE: 69 BPM | SYSTOLIC BLOOD PRESSURE: 129 MMHG

## 2021-11-04 VITALS — BODY MASS INDEX: 22.7 KG/M2

## 2021-11-04 VITALS — RESPIRATION RATE: 16 BRPM

## 2021-11-04 DIAGNOSIS — M47.22: Primary | ICD-10-CM

## 2021-11-04 DIAGNOSIS — F41.9: ICD-10-CM

## 2021-11-04 PROCEDURE — 64490 INJ PARAVERT F JNT C/T 1 LEV: CPT

## 2021-11-04 PROCEDURE — 62321 NJX INTERLAMINAR CRV/THRC: CPT

## 2021-11-04 NOTE — P.PCN
Date of Procedure: 11/04/21


Surgeon: Lin Shaw


Pathology: none sent


Condition: stable


Disposition: PACU


Description of Procedure: 


PROCEDURE


1. Cervical epidural steroid injection under fluoroscopic guidance, C6-7 left  

paramedian approach. 


2. Cervical epidurogram. 


: 





PREOPERATIVE DIAGNOSIS:   Cervical radiculopathy, cervical spondylosis without 

myelopathy





POSTOPERATIVE DIAGNOSIS: :  Same as above 





ANESTHESIA: Local anesthesia with 1% lidocaine and IV moderate conscious 

sedation with Versed  and Fentanyl .





EBL 0





PROCEDURE INDICATION: The patient with neck pain and radiculopathy unresponsive 

to conservative treatment consents for procedure. 





PROCEDURE DESCRIPTION / TECHNIQUE: The patient was seen and identified in the 

preoperative area. Risks, benefits, complications, including but not limited to 

infections ,bleeding , allergic reactions to the medications ,and not complete 

pain relief,  and alternatives were discussed with the patient, the patient 

agreed to proceed with the procedure and signed the consent.


 Patient was taken to the OR and time out was completed.





The patient was placed in the prone position on the procedure table. A pillow 

was placed under the patients chest to increase the flexion of the cervical 

spine . The cervical area was prepped and draped in the usual sterile fashion. 

Vital signs were closely monitored during the procedure. Conscious sedation was 

used during the procedure to decrease patients anxiety. 


Using anterior-posterior fluoroscopy, the C7-T1 interlaminar space was 

identified and the skin over this site was marked and then infiltrated with 1% 

lidocaine subcutaneously. Subsequently, a 20-gauge 3-1/2-inch Tuohy epidural 

needle was inserted and advanced toward the epidural space by means of loss of 

resistance  to air  technique and guided by AP and lateral fluoroscopy.  The 

needle tip contacted the lamina of T1 vertebra first, then it was walked off  

bone and into the epidural space using the loss of  to air and fluoroscopic 

guidance to identify the epidural space.  The correct needle position in the 

epidural space was verified with the injection of 1  mL of the water soluble 

contrast dye Isovue and observing an excellent epidurogram with the epidural 

spread of the dye, after negative aspiration for blood and CSF and in the 

absence of paresthesias. Again after negative aspiration, 20 mg Decadron and 1 

ml of preservative free Normal Saline solution was injected and a washout of 

epidurogram was seen. Needle was withdrawn intact, skin was cleansed, and 

bandages were applied. 





A copy of the needle placement picture was saved to the fluoroscopy machine.

## 2021-11-04 NOTE — FL
EXAMINATION TYPE: FL guided pain mgmt statistic

 

DATE OF EXAM: 11/4/2021

 

CLINICAL HISTORY: Neck pain.

 

TECHNIQUE: Fluoroscopy.

 

COMPARISON:  None.

 

FINDINGS:  Fluoroscopic guidance was provided during pain relief procedure performed by Dr. Shaw .
  A total of 4 seconds of fluoroscopic time was utilized during the procedure and 1 spot images are a
cquired. Single image acquired shows needle localization  near level of the cervical thoracic junctio
n.

 

IMPRESSION:  As Above.

## 2021-12-14 ENCOUNTER — HOSPITAL ENCOUNTER (OUTPATIENT)
Dept: HOSPITAL 47 - ORPAIN | Age: 56
Discharge: HOME | End: 2021-12-14
Attending: ANESTHESIOLOGY
Payer: COMMERCIAL

## 2021-12-14 VITALS — TEMPERATURE: 96.4 F

## 2021-12-14 VITALS — SYSTOLIC BLOOD PRESSURE: 136 MMHG | DIASTOLIC BLOOD PRESSURE: 76 MMHG | HEART RATE: 77 BPM

## 2021-12-14 VITALS — BODY MASS INDEX: 22.4 KG/M2

## 2021-12-14 VITALS — RESPIRATION RATE: 16 BRPM

## 2021-12-14 DIAGNOSIS — Z88.6: ICD-10-CM

## 2021-12-14 DIAGNOSIS — M47.22: Primary | ICD-10-CM

## 2021-12-14 PROCEDURE — 99152 MOD SED SAME PHYS/QHP 5/>YRS: CPT

## 2021-12-14 PROCEDURE — 62321 NJX INTERLAMINAR CRV/THRC: CPT

## 2021-12-14 NOTE — FL
EXAMINATION TYPE: FL guided pain mgmt statistic

 

DATE OF EXAM: 12/14/2021

 

HISTORY: Fluoroscopy  time

 

3 seconds of fluoroscopy provided. 

 

IMPRESSION:

1. Fluoroscopy time.

## 2021-12-14 NOTE — P.PCN
Date of Procedure: 12/14/21


Surgeon: Lin Shaw


Pathology: none sent


Condition: stable


Disposition: PACU


Description of Procedure: 


PROCEDURE


1. Cervical epidural steroid injection under fluoroscopic guidance, C7-T1 left 

paramedian approach. 


2. Cervical epidurogram. 


: 





PREOPERATIVE DIAGNOSIS:   Cervical radiculopathy, cervical spondylosis without 

myelopathy





POSTOPERATIVE DIAGNOSIS: :  Same as above 





ANESTHESIA: Local anesthesia with 1% lidocaine and IV moderate conscious 

sedation with Versed  and Fentanyl .





EBL 0





PROCEDURE INDICATION: The patient with neck pain and radiculopathy unresponsive 

to conservative treatment consents for procedure. 





PROCEDURE DESCRIPTION / TECHNIQUE: The patient was seen and identified in the 

preoperative area. Risks, benefits, complications, including but not limited to 

infections ,bleeding , allergic reactions to the medications ,and not complete 

pain relief,  and alternatives were discussed with the patient, the patient 

agreed to proceed with the procedure and signed the consent.


 Patient was taken to the OR and time out was completed.





The patient was placed in the prone position on the procedure table. A pillow 

was placed under the patients chest to increase the flexion of the cervical 

spine . The cervical area was prepped and draped in the usual sterile fashion. 

Vital signs were closely monitored during the procedure. Conscious sedation was 

used during the procedure to decrease patients anxiety. 


Using anterior-posterior fluoroscopy, the C7-T1 interlaminar space was 

identified and the skin over this site was marked and then infiltrated with 1% 

lidocaine subcutaneously. Subsequently, a 20-gauge 3-1/2-inch Tuohy epidural 

needle was inserted and advanced toward the epidural space by means of loss of 

resistance  to air  technique and guided by AP and lateral fluoroscopy.  The 

needle tip contacted the lamina of T1 vertebra first, then it was walked off  

bone and into the epidural space using the loss of  to air and fluoroscopic 

guidance to identify the epidural space.  The correct needle position in the 

epidural space was verified with the injection of 1  mL of the water soluble 

contrast dye Isovue and observing an excellent epidurogram with the epidural 

spread of the dye, after negative aspiration for blood and CSF and in the 

absence of paresthesias. Again after negative aspiration, a 2 ml mixture 

containing 10 mg of Decadron and 1 ml of preservative free Normal Saline 

solution was injected and a washout of epidurogram was seen. Needle was 

withdrawn intact, skin was cleansed, and bandages were applied. 





A copy of the needle placement picture was saved to the fluoroscopy machine.

## 2022-03-28 ENCOUNTER — HOSPITAL ENCOUNTER (EMERGENCY)
Dept: HOSPITAL 47 - EC | Age: 57
LOS: 1 days | Discharge: HOME | End: 2022-03-29
Payer: COMMERCIAL

## 2022-03-28 VITALS — TEMPERATURE: 97.6 F

## 2022-03-28 VITALS — HEART RATE: 73 BPM

## 2022-03-28 DIAGNOSIS — I10: ICD-10-CM

## 2022-03-28 DIAGNOSIS — J44.9: ICD-10-CM

## 2022-03-28 DIAGNOSIS — E07.9: ICD-10-CM

## 2022-03-28 DIAGNOSIS — G89.29: ICD-10-CM

## 2022-03-28 DIAGNOSIS — M47.816: Primary | ICD-10-CM

## 2022-03-28 DIAGNOSIS — F32.A: ICD-10-CM

## 2022-03-28 DIAGNOSIS — F17.200: ICD-10-CM

## 2022-03-28 DIAGNOSIS — M19.90: ICD-10-CM

## 2022-03-28 PROCEDURE — 99283 EMERGENCY DEPT VISIT LOW MDM: CPT

## 2022-03-28 PROCEDURE — 72100 X-RAY EXAM L-S SPINE 2/3 VWS: CPT

## 2022-03-28 NOTE — ED
General Adult HPI





- General


Chief complaint: Back Pain/Injury


Stated complaint: Back pain


Time Seen by Provider: 03/28/22 21:05


Source: patient, RN notes reviewed


Mode of arrival: ambulatory


Limitations: no limitations





- History of Present Illness


Initial comments: 





57-year-old male presents to the emergency department for evaluation of low back

pain.  Patient reports a history of chronic low back pain for which he used to 

take Norco daily. States he had a fall earlier today and has had worsening pain 

with position change intermittently since. Describes spasming discomfort as 

well. Denies numbness or tingling to the lower extremities, foot drop, or saddle

anesthesia.





- Related Data


                                Home Medications











 Medication  Instructions  Recorded  Confirmed


 


Cyclobenzaprine [Flexeril] 10 mg PO DAILY 03/28/22 03/28/22


 


Gabapentin [Neurontin] 300 mg PO DAILY 03/28/22 03/28/22


 


Meloxicam [Mobic] 15 mg PO DAILY 03/28/22 03/28/22


 


Montelukast Sodium [Singulair] 10 mg PO DAILY 03/28/22 03/28/22











                                    Allergies











Allergy/AdvReac Type Severity Reaction Status Date / Time


 


aspirin Allergy  Rash/Hives Verified 03/28/22 22:29


 


ketchup Allergy  Rash/Hives Verified 03/28/22 22:29














Review of Systems


ROS Statement: 


Those systems with pertinent positive or pertinent negative responses have been 

documented in the HPI.





ROS Other: All systems not noted in ROS Statement are negative.





Past Medical History


Past Medical History: COPD, Hypertension, Osteoarthritis (OA), Thyroid Disorder


Additional Past Medical History / Comment(s): Hyperparathyroidism status post 

parathyroidectomy.


History of Any Multi-Drug Resistant Organisms: None Reported


Past Surgical History: No Surgical Hx Reported


Additional Past Surgical History / Comment(s): parathyroidectomy and sinus 

surgery. PAIN CLINIC PROCEDURES


Past Anesthesia/Blood Transfusion Reactions: No Reported Reaction


Past Psychological History: Depression


Smoking Status: Current every day smoker


Past Alcohol Use History: None Reported


Past Drug Use History: None Reported





- Past Family History


  ** Father


Family Medical History: No Reported History





  ** Mother


Family Medical History: No Reported History





  ** Brother(s)


Family Medical History: Unable to Obtain





  ** Sister(s)


Family Medical History: Unable to Obtain





  ** Son(s)


Family Medical History: No Reported History





General Exam


Limitations: no limitations (Well-developed, well-nourished male in no acute 

distress.  Initial temperature 97.6, pulse 64, respirations 20, blood pressure 

133/80, pulse ox 98% on room air.)


General appearance: alert, in no apparent distress


Respiratory exam: Present: normal lung sounds bilaterally.  Absent: respiratory 

distress, wheezes, rales, rhonchi, stridor, chest wall tenderness


Cardiovascular Exam: Present: regular rate, normal rhythm, normal heart sounds. 

 Absent: systolic murmur, diastolic murmur, rubs, gallop, clicks


GI/Abdominal exam: Present: soft, normal bowel sounds.  Absent: distended, 

tenderness, guarding, rebound, rigid


Back exam: Present: normal inspection, muscle spasm (spasmodic nature of pain 

with position change), other (observed patient moving freely and changing 

position without difficulty, however, patient verbalizes discomfort with 

position change.).  Absent: full ROM, tenderness, CVA tenderness (R), CVA 

tenderness (L), paraspinal tenderness, vertebral tenderness


Neurological exam: Present: alert, oriented X3, CN II-XII intact, normal gait


Psychiatric exam: Present: normal affect, normal mood


Skin exam: Present: warm, dry, intact, normal color





Course


                                   Vital Signs











  03/28/22 03/28/22 03/29/22





  17:43 23:08 00:20


 


Temperature 97.6 F  


 


Pulse Rate 64 73 73


 


Respiratory 20 22 18





Rate   


 


Blood Pressure 133/80 135/69 132/87


 


O2 Sat by Pulse 98 98 96





Oximetry   














- Reevaluation(s)


Reevaluation #1: 





03/28/22 22:50


Upon reassessment, patient is asleep and appears to be resting comfortably. When

 awakened, patient states he has not received his pain medication yet.  Reports 

feeling very uncomfortable.





03/29/22 00:00


Upon reassessment, patient is asleep and continues to be resting comfortably.  

Upon awakening, patient reports improvement in pain level.  Patient will be 

discharged home to follow up with PCP and back specialist.








Medical Decision Making





- Medical Decision Making





57-year-old male with a history of chronic neck and back pain presents to the 

emergency department for evaluation of low back pain s/p fall earlier today. 

Upon exam, patient appears to be resting comfortably and in not acute distress. 

He is observed moving all extremities freely and repositioning without 

difficulty. SLR negative. Patient appears to have spasmodic pain when 

repositioning upright. He is able to ambulate without difficulty. No saddle 

anesthesia, numbness or tingling to extremities, or foot trauma.  Patient states

 he has been his PCP for this same complaint.  Had been taking Norco though is 

no longer prescribe this medication.  States he was supposed to see Dr. Lombardi for his back, however missed the appointment. Patient is taking 

Neurontin, Flexeril, and Motrin as prescribed. He will be discharged home to 

continue his home medication regimen and to follow up with his PCP and DR. Lombardi for further evaluation and treatment.  Return parameters were 

discussed in detail.  Patient verbalizes understanding and agrees with this 

plan.  Attending: Vero.





- Radiology Data


Radiology results: report reviewed, image reviewed


X-ray of the lumbar spine was obtained.  Report was reviewed in its entirety.  

Impression per Dr. Roberson is mild spondylytic changes.  No fracture seen no 

change compared to old exam.





Disposition


Clinical Impression: 


 Exacerbation of chronic back pain





Disposition: HOME SELF-CARE


Condition: Stable


Instructions (If sedation given, give patient instructions):  Back Pain (ED)


Additional Instructions: 


 Follow up care is important- please call to reschedule your missed appointment 

with Dr. Lombardi.


Continue your home medication regimen; you are already on an anti-inflammatory 

pain medication, muscle relaxer, and neurontin.


Maintain mobility.


May apply heat or ice.


Return to the emergency department with any new, worsening, or concerning 

symptoms.


Is patient prescribed a controlled substance at d/c from ED?: No


Referrals: 


Dexter Rodriguez MD [Primary Care Provider] - 1-2 days


Time of Disposition: 00:15

## 2022-03-28 NOTE — XR
EXAMINATION TYPE: XR lumbar spine 2 or 3V

 

DATE OF EXAM: 3/28/2022

 

COMPARISON: 7/28/2016

 

HISTORY: Back pain

 

TECHNIQUE: 3 views

 

FINDINGS: Lumbar vertebra have normal alignment. There is minor spurring at L4-5 and L5-S1. No signif
icant compression deformity. Sacroiliac joints are intact.

 

IMPRESSION: Mild spondylotic changes. No fracture seen. No change compared to old exam.

## 2022-03-29 VITALS — RESPIRATION RATE: 18 BRPM | SYSTOLIC BLOOD PRESSURE: 132 MMHG | DIASTOLIC BLOOD PRESSURE: 87 MMHG

## 2023-01-06 ENCOUNTER — HOSPITAL ENCOUNTER (OUTPATIENT)
Dept: HOSPITAL 47 - RADMRIMAIN | Age: 58
Discharge: HOME | End: 2023-01-06
Attending: ORTHOPAEDIC SURGERY
Payer: COMMERCIAL

## 2023-01-06 DIAGNOSIS — M43.12: Primary | ICD-10-CM

## 2023-01-06 DIAGNOSIS — M47.22: ICD-10-CM

## 2023-01-06 PROCEDURE — 72141 MRI NECK SPINE W/O DYE: CPT

## 2023-01-08 NOTE — MR
MRI CERVICAL SPINE:

 

CLINICAL HISTORY: Neck pain, headaches. spondylosis with radiculopathy.

 

TECHNIQUE: Multiplanar, multisequence imaging of the cervical spine is performed without IV contrast.


 

COMPARISON: MRI cervical spine May 29, 2021. Cervical spine x-ray December 19, 2022.

 

FINDINGS: Sagittal images of the cervical spine show the craniocervical junction to remain within nor
mal limits.  The cervical and upper thoracic spinal cord is normal in course, caliber, and signal. Gr
jeison 1 retrolisthesis C3 on C4 and C5 on C6 along with to lesser degree C6 on C7 is redemonstrated.  T
he vertebral body heights are normal. Moderate disc space narrowing C3-C4 and C5-C6 levels with heter
ogeneous Modic type I and type II endplate changes is seen. Mild/moderate disc space narrowing C6-C7 
level. 

 

Axial images show C2-C3 level to remain within normal limits.

 

Axial images at C3-C4 level redemonstrate spondylolisthesis with broad-based posterior disc protrusio
n and uncovertebral facet degenerative changes effacing anterior thecal sac and causing moderate to s
evere left greater than right bilateral neural foraminal narrowing. No significant change from prior.


 

Axial images at C4-C5 level showed tiny central disc protrusion minimally effacing the anterior theca
l sac on image 33 and uncovertebral facet degenerative changes causing asymmetric mild right-sided ne
ural foraminal narrowing. No significant change from prior.

 

Axial images at C5-C6 level show subtle spondylolisthesis with broad-based posterior disc protrusion 
effacing the anterior thecal sac and causing mild to moderate left greater than right bilateral neura
l foraminal narrowing.

 

Axial images at C6-C7 level shows subtle spondylolisthesis with posterior spur disc complex minimally
 effaces the anterior thecal sac and causing mild left-sided neural foraminal narrowing. No significa
nt change from prior.

 

Axial images at C7-T1 level remain within normal limits.

 

Incidental 1.5 cm mucous retention cyst or polyp in the inferior right maxillary sinus sagittal image
 17 is redemonstrated

 

IMPRESSION: Multilevel spondylolisthesis and degenerative change in the cervical spine as detailed ab
ove. No significant change from prior MRI.

## 2023-01-11 ENCOUNTER — HOSPITAL ENCOUNTER (OUTPATIENT)
Dept: HOSPITAL 47 - RADCTMAIN | Age: 58
Discharge: HOME | End: 2023-01-11
Attending: ORTHOPAEDIC SURGERY
Payer: COMMERCIAL

## 2023-01-11 DIAGNOSIS — M47.22: Primary | ICD-10-CM

## 2023-01-11 DIAGNOSIS — M99.71: ICD-10-CM

## 2023-01-11 DIAGNOSIS — M50.122: ICD-10-CM

## 2023-01-11 PROCEDURE — 72125 CT NECK SPINE W/O DYE: CPT
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
